# Patient Record
Sex: FEMALE | Race: BLACK OR AFRICAN AMERICAN | URBAN - METROPOLITAN AREA
[De-identification: names, ages, dates, MRNs, and addresses within clinical notes are randomized per-mention and may not be internally consistent; named-entity substitution may affect disease eponyms.]

---

## 2018-03-21 ENCOUNTER — OFFICE VISIT (OUTPATIENT)
Dept: FAMILY MEDICINE CLINIC | Facility: CLINIC | Age: 43
End: 2018-03-21

## 2018-03-21 VITALS
TEMPERATURE: 98 F | WEIGHT: 140 LBS | HEIGHT: 61 IN | BODY MASS INDEX: 26.43 KG/M2 | DIASTOLIC BLOOD PRESSURE: 81 MMHG | RESPIRATION RATE: 16 BRPM | SYSTOLIC BLOOD PRESSURE: 127 MMHG | HEART RATE: 88 BPM

## 2018-03-21 DIAGNOSIS — J01.10 ACUTE NON-RECURRENT FRONTAL SINUSITIS: Primary | ICD-10-CM

## 2018-03-21 DIAGNOSIS — J45.21 MILD INTERMITTENT ASTHMA WITH ACUTE EXACERBATION: ICD-10-CM

## 2018-03-21 PROCEDURE — 99212 OFFICE O/P EST SF 10 MIN: CPT | Performed by: FAMILY MEDICINE

## 2018-03-21 PROCEDURE — 99214 OFFICE O/P EST MOD 30 MIN: CPT | Performed by: FAMILY MEDICINE

## 2018-03-21 RX ORDER — AMOXICILLIN AND CLAVULANATE POTASSIUM 875; 125 MG/1; MG/1
1 TABLET, FILM COATED ORAL 2 TIMES DAILY
Qty: 20 TABLET | Refills: 0 | Status: SHIPPED | OUTPATIENT
Start: 2018-03-21 | End: 2018-03-31

## 2018-03-21 RX ORDER — ALBUTEROL SULFATE 90 UG/1
2 AEROSOL, METERED RESPIRATORY (INHALATION) EVERY 4 HOURS PRN
Qty: 1 INHALER | Refills: 6 | Status: SHIPPED | OUTPATIENT
Start: 2018-03-21 | End: 2020-10-28

## 2018-03-21 NOTE — PROGRESS NOTES
HPI:  Makenna Nunez is a 41year old female who presents for sinus concerns. Having severe congestion, headache, sinus pressure, and thick green nasal drainage. No fever. Using Allegra-D with some relief.   States she has a cough and asthma has been uncontrol Jeane Luna

## 2018-03-26 ENCOUNTER — APPOINTMENT (OUTPATIENT)
Dept: LAB | Age: 43
End: 2018-03-26
Attending: FAMILY MEDICINE
Payer: COMMERCIAL

## 2018-03-26 ENCOUNTER — OFFICE VISIT (OUTPATIENT)
Dept: FAMILY MEDICINE CLINIC | Facility: CLINIC | Age: 43
End: 2018-03-26

## 2018-03-26 VITALS
BODY MASS INDEX: 26.55 KG/M2 | WEIGHT: 140.63 LBS | DIASTOLIC BLOOD PRESSURE: 82 MMHG | SYSTOLIC BLOOD PRESSURE: 121 MMHG | TEMPERATURE: 98 F | RESPIRATION RATE: 16 BRPM | HEIGHT: 61 IN | HEART RATE: 77 BPM

## 2018-03-26 DIAGNOSIS — Z12.39 SCREENING FOR BREAST CANCER: ICD-10-CM

## 2018-03-26 DIAGNOSIS — Z00.00 ROUTINE PHYSICAL EXAMINATION: ICD-10-CM

## 2018-03-26 DIAGNOSIS — Z00.00 ROUTINE PHYSICAL EXAMINATION: Primary | ICD-10-CM

## 2018-03-26 DIAGNOSIS — Z01.419 ENCOUNTER FOR ANNUAL ROUTINE GYNECOLOGICAL EXAMINATION: ICD-10-CM

## 2018-03-26 DIAGNOSIS — J45.21 MILD INTERMITTENT ASTHMA WITH ACUTE EXACERBATION: ICD-10-CM

## 2018-03-26 LAB
ALBUMIN SERPL BCP-MCNC: 4.3 G/DL (ref 3.5–4.8)
ALBUMIN/GLOB SERPL: 1.4 {RATIO} (ref 1–2)
ALP SERPL-CCNC: 41 U/L (ref 32–100)
ALT SERPL-CCNC: 11 U/L (ref 14–54)
ANION GAP SERPL CALC-SCNC: 10 MMOL/L (ref 0–18)
AST SERPL-CCNC: 19 U/L (ref 15–41)
BILIRUB SERPL-MCNC: 0.3 MG/DL (ref 0.3–1.2)
BUN SERPL-MCNC: 8 MG/DL (ref 8–20)
BUN/CREAT SERPL: 15.7 (ref 10–20)
CALCIUM SERPL-MCNC: 9 MG/DL (ref 8.5–10.5)
CHLORIDE SERPL-SCNC: 102 MMOL/L (ref 95–110)
CHOLEST SERPL-MCNC: 190 MG/DL (ref 110–200)
CO2 SERPL-SCNC: 25 MMOL/L (ref 22–32)
CREAT SERPL-MCNC: 0.51 MG/DL (ref 0.5–1.5)
ERYTHROCYTE [DISTWIDTH] IN BLOOD BY AUTOMATED COUNT: 14 % (ref 11–15)
GLOBULIN PLAS-MCNC: 3.1 G/DL (ref 2.5–3.7)
GLUCOSE SERPL-MCNC: 74 MG/DL (ref 70–99)
HCT VFR BLD AUTO: 36.5 % (ref 35–48)
HDLC SERPL-MCNC: 70 MG/DL
HGB BLD-MCNC: 11.8 G/DL (ref 12–16)
LDLC SERPL CALC-MCNC: 102 MG/DL (ref 0–99)
MCH RBC QN AUTO: 26.9 PG (ref 27–32)
MCHC RBC AUTO-ENTMCNC: 32.5 G/DL (ref 32–37)
MCV RBC AUTO: 82.7 FL (ref 80–100)
NONHDLC SERPL-MCNC: 120 MG/DL
OSMOLALITY UR CALC.SUM OF ELEC: 281 MOSM/KG (ref 275–295)
PATIENT FASTING: NO
PLATELET # BLD AUTO: 223 K/UL (ref 140–400)
PMV BLD AUTO: 10.8 FL (ref 7.4–10.3)
POTASSIUM SERPL-SCNC: 3.4 MMOL/L (ref 3.3–5.1)
PROT SERPL-MCNC: 7.4 G/DL (ref 5.9–8.4)
RBC # BLD AUTO: 4.41 M/UL (ref 3.7–5.4)
SODIUM SERPL-SCNC: 137 MMOL/L (ref 136–144)
TRIGL SERPL-MCNC: 92 MG/DL (ref 1–149)
TSH SERPL-ACNC: 0.63 UIU/ML (ref 0.45–5.33)
WBC # BLD AUTO: 4.9 K/UL (ref 4–11)

## 2018-03-26 PROCEDURE — 87340 HEPATITIS B SURFACE AG IA: CPT

## 2018-03-26 PROCEDURE — 86706 HEP B SURFACE ANTIBODY: CPT

## 2018-03-26 PROCEDURE — 87389 HIV-1 AG W/HIV-1&-2 AB AG IA: CPT

## 2018-03-26 PROCEDURE — 86708 HEPATITIS A ANTIBODY: CPT

## 2018-03-26 PROCEDURE — 36415 COLL VENOUS BLD VENIPUNCTURE: CPT

## 2018-03-26 PROCEDURE — 84443 ASSAY THYROID STIM HORMONE: CPT

## 2018-03-26 PROCEDURE — 99396 PREV VISIT EST AGE 40-64: CPT | Performed by: FAMILY MEDICINE

## 2018-03-26 PROCEDURE — 85027 COMPLETE CBC AUTOMATED: CPT

## 2018-03-26 PROCEDURE — 80053 COMPREHEN METABOLIC PANEL: CPT

## 2018-03-26 PROCEDURE — 82306 VITAMIN D 25 HYDROXY: CPT

## 2018-03-26 PROCEDURE — 80061 LIPID PANEL: CPT

## 2018-03-26 PROCEDURE — 90715 TDAP VACCINE 7 YRS/> IM: CPT | Performed by: FAMILY MEDICINE

## 2018-03-26 PROCEDURE — 86803 HEPATITIS C AB TEST: CPT

## 2018-03-26 PROCEDURE — 86780 TREPONEMA PALLIDUM: CPT

## 2018-03-26 PROCEDURE — 90471 IMMUNIZATION ADMIN: CPT | Performed by: FAMILY MEDICINE

## 2018-03-26 PROCEDURE — 80500 HEPATITIS A B + C PROFILE: CPT

## 2018-03-26 PROCEDURE — 86704 HEP B CORE ANTIBODY TOTAL: CPT

## 2018-03-26 NOTE — PROGRESS NOTES
HPI: 41 yr old female who presents for physical. Seen recently for sinusitis and asthma exacerbation. States asthma is improved with Flovent daily. Not using Albuterol any longer. Congestion improving. Last pap was 2 years ago.   Never had abnormal. St supple. Good ROM. No LAD.   Thyroid normal.  CV:  Regular rate and rhythm; no murmurs  Lungs:  Clear to ausculation; good aeration               No wheezes, rales or rhonchi  Abd: soft, non-tender, non-distended          Normal bowel sounds; no masses

## 2018-03-27 LAB
C TRACH DNA SPEC QL NAA+PROBE: NEGATIVE
HAV AB SER QL IA: NONREACTIVE
HBV CORE AB SERPL QL IA: NONREACTIVE
HBV SURFACE AB SER-ACNC: <3.1 MIU/ML (ref ?–10)
HBV SURFACE AG SERPL QL IA: NONREACTIVE
HBV SURFACE AG SERPL QL IA: NONREACTIVE
HCV AB SERPL QL IA: NONREACTIVE
HIV1+2 AB SERPL QL IA: NONREACTIVE
N GONORRHOEA DNA SPEC QL NAA+PROBE: NEGATIVE

## 2018-03-28 LAB
25(OH)D3 SERPL-MCNC: 28 NG/ML
HPV I/H RISK 1 DNA SPEC QL NAA+PROBE: NEGATIVE
T PALLIDUM AB SER QL: NEGATIVE

## 2018-04-05 ENCOUNTER — HOSPITAL ENCOUNTER (OUTPATIENT)
Dept: MAMMOGRAPHY | Facility: HOSPITAL | Age: 43
Discharge: HOME | End: 2018-04-05
Attending: FAMILY MEDICINE
Payer: COMMERCIAL

## 2018-04-05 DIAGNOSIS — Z12.39 SCREENING FOR BREAST CANCER: ICD-10-CM

## 2018-04-05 PROCEDURE — 77067 SCR MAMMO BI INCL CAD: CPT | Performed by: FAMILY MEDICINE

## 2019-04-01 ENCOUNTER — OFFICE VISIT (OUTPATIENT)
Dept: FAMILY MEDICINE CLINIC | Facility: CLINIC | Age: 44
End: 2019-04-01
Payer: COMMERCIAL

## 2019-04-01 VITALS
RESPIRATION RATE: 16 BRPM | HEART RATE: 73 BPM | TEMPERATURE: 98 F | BODY MASS INDEX: 24 KG/M2 | SYSTOLIC BLOOD PRESSURE: 124 MMHG | WEIGHT: 128.63 LBS | DIASTOLIC BLOOD PRESSURE: 77 MMHG

## 2019-04-01 DIAGNOSIS — J01.00 ACUTE NON-RECURRENT MAXILLARY SINUSITIS: Primary | ICD-10-CM

## 2019-04-01 PROCEDURE — 99213 OFFICE O/P EST LOW 20 MIN: CPT | Performed by: FAMILY MEDICINE

## 2019-04-01 PROCEDURE — 99212 OFFICE O/P EST SF 10 MIN: CPT | Performed by: FAMILY MEDICINE

## 2019-04-01 RX ORDER — AMOXICILLIN AND CLAVULANATE POTASSIUM 875; 125 MG/1; MG/1
1 TABLET, FILM COATED ORAL 2 TIMES DAILY
Qty: 20 TABLET | Refills: 0 | OUTPATIENT
Start: 2019-04-01 | End: 2019-04-11

## 2019-04-01 NOTE — PROGRESS NOTES
HPI: Mylene Gray is a 45year old female who presents for sinus complaints. Has sinus drainage. Started on Saturday. Has headache, body aches, chills, cough, and dizziness. No v/d. Decreased appetite. No fever. Denies sore throat.   Son was recently diagnos

## 2019-04-03 ENCOUNTER — TELEPHONE (OUTPATIENT)
Dept: FAMILY MEDICINE CLINIC | Facility: CLINIC | Age: 44
End: 2019-04-03

## 2019-04-03 NOTE — TELEPHONE ENCOUNTER
Dr Madi Garcia, please see pt's message. She was seen on Monday, and you gave her a note for 2 days, returning to work today. Ok to extend?

## 2019-04-03 NOTE — TELEPHONE ENCOUNTER
Pt called in for note for her job    Pt said the note is for extended time off work    Pt said still not feeling well does not know if Monday will be a sufficient    Pt asked if note can be emailed.   Pt also has [employer]

## 2019-04-03 NOTE — TELEPHONE ENCOUNTER
Pt stts she would like the note faxed over to her job at [phone] attention Koko Luis.  Please call pt when faxed over at [phone]

## 2019-10-16 ENCOUNTER — APPOINTMENT (OUTPATIENT)
Dept: LAB | Age: 44
End: 2019-10-16
Attending: FAMILY MEDICINE
Payer: COMMERCIAL

## 2019-10-16 ENCOUNTER — OFFICE VISIT (OUTPATIENT)
Dept: FAMILY MEDICINE CLINIC | Facility: CLINIC | Age: 44
End: 2019-10-16
Payer: COMMERCIAL

## 2019-10-16 VITALS
WEIGHT: 132 LBS | SYSTOLIC BLOOD PRESSURE: 129 MMHG | DIASTOLIC BLOOD PRESSURE: 72 MMHG | BODY MASS INDEX: 25.24 KG/M2 | HEIGHT: 60.47 IN | HEART RATE: 61 BPM | TEMPERATURE: 98 F | RESPIRATION RATE: 16 BRPM

## 2019-10-16 DIAGNOSIS — Z00.00 ROUTINE PHYSICAL EXAMINATION: Primary | ICD-10-CM

## 2019-10-16 DIAGNOSIS — Z12.39 SCREENING FOR BREAST CANCER: ICD-10-CM

## 2019-10-16 DIAGNOSIS — Z00.00 ROUTINE PHYSICAL EXAMINATION: ICD-10-CM

## 2019-10-16 DIAGNOSIS — N92.0 MENORRHAGIA WITH REGULAR CYCLE: ICD-10-CM

## 2019-10-16 DIAGNOSIS — N89.8 VAGINAL DISCHARGE: ICD-10-CM

## 2019-10-16 PROCEDURE — 36415 COLL VENOUS BLD VENIPUNCTURE: CPT

## 2019-10-16 PROCEDURE — 85027 COMPLETE CBC AUTOMATED: CPT

## 2019-10-16 PROCEDURE — 80053 COMPREHEN METABOLIC PANEL: CPT

## 2019-10-16 PROCEDURE — 84443 ASSAY THYROID STIM HORMONE: CPT

## 2019-10-16 PROCEDURE — 99396 PREV VISIT EST AGE 40-64: CPT | Performed by: FAMILY MEDICINE

## 2019-10-16 PROCEDURE — 80061 LIPID PANEL: CPT

## 2019-10-16 PROCEDURE — 82306 VITAMIN D 25 HYDROXY: CPT

## 2019-10-16 NOTE — PROGRESS NOTES
HPI: Zenaida Woo is a 45year old female who presents for physical. Exercises twice a week. Eats healthy. Regular cycles but states her periods have been getting longer. Starts light but then gets heavier.   Can go through multiple pads in a short period symptoms  Neurological:  Negative for gait disturbance  Psychiatric:  Negative for inappropriate interaction and psychiatric symptoms      Objective:   Gen: AOx3. NAD.    /72   Pulse 61   Temp 97.9 °F (36.6 °C) (Oral)   Resp 16   Ht 5' 0.47\" (1.536 m

## 2019-10-18 RX ORDER — METRONIDAZOLE 500 MG/1
500 TABLET ORAL 2 TIMES DAILY
Qty: 14 TABLET | Refills: 0 | Status: SHIPPED | OUTPATIENT
Start: 2019-10-18 | End: 2019-10-25

## 2019-10-23 DIAGNOSIS — D64.9 ANEMIA, UNSPECIFIED TYPE: Primary | ICD-10-CM

## 2020-10-16 ENCOUNTER — TELEPHONE (OUTPATIENT)
Dept: FAMILY MEDICINE CLINIC | Facility: CLINIC | Age: 45
End: 2020-10-16

## 2020-10-16 NOTE — TELEPHONE ENCOUNTER
Pt called in to check her time for a px for 10/19. I did not see that she made one. Pt requests if dr can see her on 10/19, if not sooner then 12/7?  Please advise

## 2020-10-17 NOTE — TELEPHONE ENCOUNTER
MICAELA, if pt needs her px sooner than 12/7, please schedule as requested. May use res 24 if needed, to make 30 min appt.

## 2020-10-28 ENCOUNTER — OFFICE VISIT (OUTPATIENT)
Dept: FAMILY MEDICINE CLINIC | Facility: CLINIC | Age: 45
End: 2020-10-28
Payer: COMMERCIAL

## 2020-10-28 ENCOUNTER — LAB ENCOUNTER (OUTPATIENT)
Dept: LAB | Age: 45
End: 2020-10-28
Attending: FAMILY MEDICINE
Payer: COMMERCIAL

## 2020-10-28 VITALS
BODY MASS INDEX: 26.43 KG/M2 | HEIGHT: 61 IN | SYSTOLIC BLOOD PRESSURE: 125 MMHG | TEMPERATURE: 97 F | WEIGHT: 140 LBS | RESPIRATION RATE: 16 BRPM | DIASTOLIC BLOOD PRESSURE: 78 MMHG | HEART RATE: 66 BPM

## 2020-10-28 DIAGNOSIS — Z00.00 ROUTINE PHYSICAL EXAMINATION: ICD-10-CM

## 2020-10-28 DIAGNOSIS — J45.20 MILD INTERMITTENT ASTHMA WITHOUT COMPLICATION: ICD-10-CM

## 2020-10-28 DIAGNOSIS — D64.9 ANEMIA, UNSPECIFIED TYPE: ICD-10-CM

## 2020-10-28 DIAGNOSIS — Z12.31 ENCOUNTER FOR SCREENING MAMMOGRAM FOR BREAST CANCER: ICD-10-CM

## 2020-10-28 DIAGNOSIS — Z00.00 ROUTINE PHYSICAL EXAMINATION: Primary | ICD-10-CM

## 2020-10-28 PROCEDURE — 87340 HEPATITIS B SURFACE AG IA: CPT

## 2020-10-28 PROCEDURE — 90651 9VHPV VACCINE 2/3 DOSE IM: CPT | Performed by: FAMILY MEDICINE

## 2020-10-28 PROCEDURE — 84466 ASSAY OF TRANSFERRIN: CPT

## 2020-10-28 PROCEDURE — 83540 ASSAY OF IRON: CPT

## 2020-10-28 PROCEDURE — 82728 ASSAY OF FERRITIN: CPT

## 2020-10-28 PROCEDURE — 80053 COMPREHEN METABOLIC PANEL: CPT

## 2020-10-28 PROCEDURE — 86704 HEP B CORE ANTIBODY TOTAL: CPT

## 2020-10-28 PROCEDURE — 90471 IMMUNIZATION ADMIN: CPT | Performed by: FAMILY MEDICINE

## 2020-10-28 PROCEDURE — 3074F SYST BP LT 130 MM HG: CPT | Performed by: FAMILY MEDICINE

## 2020-10-28 PROCEDURE — 3078F DIAST BP <80 MM HG: CPT | Performed by: FAMILY MEDICINE

## 2020-10-28 PROCEDURE — 80500 HEPATITIS A B + C PROFILE: CPT

## 2020-10-28 PROCEDURE — 3008F BODY MASS INDEX DOCD: CPT | Performed by: FAMILY MEDICINE

## 2020-10-28 PROCEDURE — 80061 LIPID PANEL: CPT

## 2020-10-28 PROCEDURE — 87389 HIV-1 AG W/HIV-1&-2 AB AG IA: CPT

## 2020-10-28 PROCEDURE — 86803 HEPATITIS C AB TEST: CPT

## 2020-10-28 PROCEDURE — 36415 COLL VENOUS BLD VENIPUNCTURE: CPT

## 2020-10-28 PROCEDURE — 86708 HEPATITIS A ANTIBODY: CPT

## 2020-10-28 PROCEDURE — 99396 PREV VISIT EST AGE 40-64: CPT | Performed by: FAMILY MEDICINE

## 2020-10-28 PROCEDURE — 87591 N.GONORRHOEAE DNA AMP PROB: CPT

## 2020-10-28 PROCEDURE — 84443 ASSAY THYROID STIM HORMONE: CPT

## 2020-10-28 PROCEDURE — 85027 COMPLETE CBC AUTOMATED: CPT

## 2020-10-28 PROCEDURE — 86780 TREPONEMA PALLIDUM: CPT

## 2020-10-28 PROCEDURE — 87491 CHLMYD TRACH DNA AMP PROBE: CPT

## 2020-10-28 PROCEDURE — 86706 HEP B SURFACE ANTIBODY: CPT

## 2020-10-28 RX ORDER — ALBUTEROL SULFATE 90 UG/1
2 AEROSOL, METERED RESPIRATORY (INHALATION) EVERY 4 HOURS PRN
Qty: 1 INHALER | Refills: 6 | Status: SHIPPED | OUTPATIENT
Start: 2020-10-28

## 2020-10-28 NOTE — PROGRESS NOTES
HPI: 39 yr old female who presents for physical. Works as insurance rep. Works in office. Stopped exercising due to pandemic. Gained some weight. Walks now. Eats healthy. Has 3 children. Ages 23,16 and 5. Asthma is controlled.   Only uses Albuterol w aeration               No wheezes, rales or rhonchi  Abd: soft, non-tender, non-distended          Normal bowel sounds; no masses          No hepatosplenomegaly  Breasts:  Normal appearance bilateral.  No masses or lesions noted.   Normal nipples bilateral.

## 2020-11-04 ENCOUNTER — PATIENT MESSAGE (OUTPATIENT)
Dept: FAMILY MEDICINE CLINIC | Facility: CLINIC | Age: 45
End: 2020-11-04

## 2020-11-04 ENCOUNTER — HOSPITAL ENCOUNTER (OUTPATIENT)
Dept: MAMMOGRAPHY | Facility: HOSPITAL | Age: 45
Discharge: HOME | End: 2020-11-04
Attending: FAMILY MEDICINE
Payer: COMMERCIAL

## 2020-11-04 DIAGNOSIS — Z12.31 ENCOUNTER FOR SCREENING MAMMOGRAM FOR BREAST CANCER: ICD-10-CM

## 2020-11-04 DIAGNOSIS — R92.8 ABNORMAL MAMMOGRAM OF RIGHT BREAST: Primary | ICD-10-CM

## 2020-11-04 PROCEDURE — 77063 BREAST TOMOSYNTHESIS BI: CPT | Performed by: FAMILY MEDICINE

## 2020-11-04 PROCEDURE — 77067 SCR MAMMO BI INCL CAD: CPT | Performed by: FAMILY MEDICINE

## 2020-11-04 NOTE — TELEPHONE ENCOUNTER
From: Devi Wade  To: Vinny Varghese DO  Sent: 11/4/2020 11:22 AM CST  Subject: Test Results Question    I have a question about [facility] SANDY 2D+3D SCREENING BILAT (CPT=77067/60207) resulted on 11/4/20, 7:48 AM.    Morning does this mean I have to have

## 2020-11-12 ENCOUNTER — HOSPITAL ENCOUNTER (OUTPATIENT)
Dept: MAMMOGRAPHY | Facility: HOSPITAL | Age: 45
Discharge: HOME | End: 2020-11-12
Attending: FAMILY MEDICINE
Payer: COMMERCIAL

## 2020-11-12 DIAGNOSIS — R92.8 ABNORMAL MAMMOGRAM: ICD-10-CM

## 2020-11-12 PROCEDURE — 77061 BREAST TOMOSYNTHESIS UNI: CPT | Performed by: FAMILY MEDICINE

## 2020-11-12 PROCEDURE — 77065 DX MAMMO INCL CAD UNI: CPT | Performed by: FAMILY MEDICINE

## 2020-11-30 ENCOUNTER — NURSE ONLY (OUTPATIENT)
Dept: FAMILY MEDICINE CLINIC | Facility: CLINIC | Age: 45
End: 2020-11-30
Payer: COMMERCIAL

## 2020-11-30 PROCEDURE — 90651 9VHPV VACCINE 2/3 DOSE IM: CPT | Performed by: FAMILY MEDICINE

## 2020-11-30 PROCEDURE — 90471 IMMUNIZATION ADMIN: CPT | Performed by: FAMILY MEDICINE

## 2021-02-08 ENCOUNTER — TELEMEDICINE (OUTPATIENT)
Dept: FAMILY MEDICINE CLINIC | Facility: CLINIC | Age: 46
End: 2021-02-08

## 2021-02-08 ENCOUNTER — NURSE TRIAGE (OUTPATIENT)
Dept: FAMILY MEDICINE CLINIC | Facility: CLINIC | Age: 46
End: 2021-02-08

## 2021-02-08 DIAGNOSIS — R68.89 FLU-LIKE SYMPTOMS: Primary | ICD-10-CM

## 2021-02-08 PROCEDURE — 99213 OFFICE O/P EST LOW 20 MIN: CPT | Performed by: PHYSICIAN ASSISTANT

## 2021-02-08 NOTE — PROGRESS NOTES
Please note that the following visit was completed using two-way, real-time interactive audio and/or video communication.   This has been done in good cynthia to provide continuity of care in the best interest of the provider-patient relationship, due to the Aero Soln Inhale 2 puffs into the lungs every 4 (four) hours as needed for Wheezing. 1 Inhaler 6   • Multiple Vitamins-Minerals (MULTIVITAL) Oral Tab Take 1 tablet by mouth daily. • ALBUTEROL SULFATE HFA IN Inhale  into the lungs.          Allergies:  S discussion above. Patient reminded to practice good health and safety measures including washing hands, social distancing, covering mouth when coughing/ sneezing, avoid social meetings/ gatherings in face of this Covid 19 pandemic.     Patient verbalize

## 2021-05-10 ENCOUNTER — TELEPHONE (OUTPATIENT)
Dept: FAMILY MEDICINE CLINIC | Facility: CLINIC | Age: 46
End: 2021-05-10

## 2021-05-10 NOTE — TELEPHONE ENCOUNTER
Please call patient to reschedule a nurse visit for her HPV vaccine. Patient could not receive it when she came on 4/28 because it was less than 2 weeks after her COVID-19 vaccine.

## 2022-05-12 ENCOUNTER — PATIENT MESSAGE (OUTPATIENT)
Dept: FAMILY MEDICINE CLINIC | Facility: CLINIC | Age: 47
End: 2022-05-12

## 2022-05-13 ENCOUNTER — NURSE TRIAGE (OUTPATIENT)
Dept: FAMILY MEDICINE CLINIC | Facility: CLINIC | Age: 47
End: 2022-05-13

## 2022-05-13 NOTE — TELEPHONE ENCOUNTER
Called patient, confirmed name and . Informed of advise below. Patient verbalized understanding and agrees.       Future Appointments   Date Time Provider aCrlos Emery   2022 10:15 AM DO GEORGE Del Angel

## 2022-05-18 ENCOUNTER — OFFICE VISIT (OUTPATIENT)
Dept: FAMILY MEDICINE CLINIC | Facility: CLINIC | Age: 47
End: 2022-05-18
Payer: COMMERCIAL

## 2022-05-18 ENCOUNTER — LAB ENCOUNTER (OUTPATIENT)
Dept: LAB | Age: 47
End: 2022-05-18
Attending: FAMILY MEDICINE
Payer: COMMERCIAL

## 2022-05-18 VITALS
HEIGHT: 61 IN | OXYGEN SATURATION: 100 % | BODY MASS INDEX: 26.62 KG/M2 | RESPIRATION RATE: 16 BRPM | TEMPERATURE: 98 F | SYSTOLIC BLOOD PRESSURE: 110 MMHG | DIASTOLIC BLOOD PRESSURE: 72 MMHG | HEART RATE: 74 BPM | WEIGHT: 141 LBS

## 2022-05-18 DIAGNOSIS — Z12.11 ENCOUNTER FOR SCREENING COLONOSCOPY: Primary | ICD-10-CM

## 2022-05-18 DIAGNOSIS — J45.31 MILD PERSISTENT ASTHMA WITH ACUTE EXACERBATION: ICD-10-CM

## 2022-05-18 DIAGNOSIS — Z00.00 ROUTINE PHYSICAL EXAMINATION: ICD-10-CM

## 2022-05-18 DIAGNOSIS — R10.2 PELVIC PAIN: ICD-10-CM

## 2022-05-18 DIAGNOSIS — Z12.31 ENCOUNTER FOR SCREENING MAMMOGRAM FOR MALIGNANT NEOPLASM OF BREAST: ICD-10-CM

## 2022-05-18 LAB
ALBUMIN SERPL-MCNC: 3.6 G/DL (ref 3.4–5)
ALBUMIN/GLOB SERPL: 0.9 {RATIO} (ref 1–2)
ALP LIVER SERPL-CCNC: 54 U/L
ALT SERPL-CCNC: 11 U/L
ANION GAP SERPL CALC-SCNC: 7 MMOL/L (ref 0–18)
AST SERPL-CCNC: 13 U/L (ref 15–37)
B-HCG SERPL-ACNC: <1 MIU/ML
BILIRUB SERPL-MCNC: 0.3 MG/DL (ref 0.1–2)
BUN BLD-MCNC: 7 MG/DL (ref 7–18)
BUN/CREAT SERPL: 10.8 (ref 10–20)
CALCIUM BLD-MCNC: 8.7 MG/DL (ref 8.5–10.1)
CHLORIDE SERPL-SCNC: 107 MMOL/L (ref 98–112)
CHOLEST SERPL-MCNC: 166 MG/DL (ref ?–200)
CO2 SERPL-SCNC: 26 MMOL/L (ref 21–32)
CREAT BLD-MCNC: 0.65 MG/DL
DEPRECATED RDW RBC AUTO: 48.6 FL (ref 35.1–46.3)
ERYTHROCYTE [DISTWIDTH] IN BLOOD BY AUTOMATED COUNT: 15.5 % (ref 11–15)
EST. AVERAGE GLUCOSE BLD GHB EST-MCNC: 94 MG/DL (ref 68–126)
FASTING PATIENT LIPID ANSWER: YES
FASTING STATUS PATIENT QL REPORTED: YES
GLOBULIN PLAS-MCNC: 3.9 G/DL (ref 2.8–4.4)
GLUCOSE BLD-MCNC: 86 MG/DL (ref 70–99)
HAV AB SER QL IA: NONREACTIVE
HBA1C MFR BLD: 4.9 % (ref ?–5.7)
HBV CORE AB SERPL QL IA: NONREACTIVE
HBV SURFACE AB SER QL: NONREACTIVE
HBV SURFACE AB SERPL IA-ACNC: <3.1 MIU/ML
HBV SURFACE AG SERPL QL IA: NONREACTIVE
HCT VFR BLD AUTO: 37.1 %
HCV AB SERPL QL IA: NONREACTIVE
HDLC SERPL-MCNC: 66 MG/DL (ref 40–59)
HGB BLD-MCNC: 11.2 G/DL
LDLC SERPL CALC-MCNC: 88 MG/DL (ref ?–100)
MCH RBC QN AUTO: 26 PG (ref 26–34)
MCHC RBC AUTO-ENTMCNC: 30.2 G/DL (ref 31–37)
MCV RBC AUTO: 86.3 FL
NONHDLC SERPL-MCNC: 100 MG/DL (ref ?–130)
OSMOLALITY SERPL CALC.SUM OF ELEC: 287 MOSM/KG (ref 275–295)
PLATELET # BLD AUTO: 234 10(3)UL (ref 150–450)
POTASSIUM SERPL-SCNC: 3.6 MMOL/L (ref 3.5–5.1)
PROT SERPL-MCNC: 7.5 G/DL (ref 6.4–8.2)
RBC # BLD AUTO: 4.3 X10(6)UL
SODIUM SERPL-SCNC: 140 MMOL/L (ref 136–145)
TRIGL SERPL-MCNC: 59 MG/DL (ref 30–149)
TSI SER-ACNC: 1.1 MIU/ML (ref 0.36–3.74)
VLDLC SERPL CALC-MCNC: 9 MG/DL (ref 0–30)
WBC # BLD AUTO: 10.1 X10(3) UL (ref 4–11)

## 2022-05-18 PROCEDURE — 80061 LIPID PANEL: CPT

## 2022-05-18 PROCEDURE — 36415 COLL VENOUS BLD VENIPUNCTURE: CPT

## 2022-05-18 PROCEDURE — 87389 HIV-1 AG W/HIV-1&-2 AB AG IA: CPT

## 2022-05-18 PROCEDURE — 3008F BODY MASS INDEX DOCD: CPT | Performed by: FAMILY MEDICINE

## 2022-05-18 PROCEDURE — 85027 COMPLETE CBC AUTOMATED: CPT

## 2022-05-18 PROCEDURE — 86803 HEPATITIS C AB TEST: CPT

## 2022-05-18 PROCEDURE — 99396 PREV VISIT EST AGE 40-64: CPT | Performed by: FAMILY MEDICINE

## 2022-05-18 PROCEDURE — 86780 TREPONEMA PALLIDUM: CPT

## 2022-05-18 PROCEDURE — 86708 HEPATITIS A ANTIBODY: CPT

## 2022-05-18 PROCEDURE — 3074F SYST BP LT 130 MM HG: CPT | Performed by: FAMILY MEDICINE

## 2022-05-18 PROCEDURE — 3078F DIAST BP <80 MM HG: CPT | Performed by: FAMILY MEDICINE

## 2022-05-18 PROCEDURE — 80053 COMPREHEN METABOLIC PANEL: CPT

## 2022-05-18 PROCEDURE — 83036 HEMOGLOBIN GLYCOSYLATED A1C: CPT

## 2022-05-18 PROCEDURE — 84443 ASSAY THYROID STIM HORMONE: CPT

## 2022-05-18 PROCEDURE — 80503 PATH CLIN CONSLTJ SF 5-20: CPT

## 2022-05-18 PROCEDURE — 86704 HEP B CORE ANTIBODY TOTAL: CPT

## 2022-05-18 PROCEDURE — 86706 HEP B SURFACE ANTIBODY: CPT

## 2022-05-18 PROCEDURE — 84702 CHORIONIC GONADOTROPIN TEST: CPT

## 2022-05-18 PROCEDURE — 87340 HEPATITIS B SURFACE AG IA: CPT

## 2022-05-20 LAB — T PALLIDUM AB SER QL: NEGATIVE

## 2022-05-21 ENCOUNTER — HOSPITAL ENCOUNTER (OUTPATIENT)
Dept: MAMMOGRAPHY | Facility: HOSPITAL | Age: 47
Discharge: HOME | End: 2022-05-21
Attending: FAMILY MEDICINE
Payer: COMMERCIAL

## 2022-05-21 DIAGNOSIS — Z12.31 ENCOUNTER FOR SCREENING MAMMOGRAM FOR MALIGNANT NEOPLASM OF BREAST: ICD-10-CM

## 2022-05-21 PROCEDURE — 77063 BREAST TOMOSYNTHESIS BI: CPT | Performed by: FAMILY MEDICINE

## 2022-05-21 PROCEDURE — 77067 SCR MAMMO BI INCL CAD: CPT | Performed by: FAMILY MEDICINE

## 2022-05-25 ENCOUNTER — OFFICE VISIT (OUTPATIENT)
Dept: FAMILY MEDICINE CLINIC | Facility: CLINIC | Age: 47
End: 2022-05-25
Payer: COMMERCIAL

## 2022-05-25 VITALS
RESPIRATION RATE: 16 BRPM | SYSTOLIC BLOOD PRESSURE: 117 MMHG | DIASTOLIC BLOOD PRESSURE: 65 MMHG | HEART RATE: 83 BPM | TEMPERATURE: 98 F

## 2022-05-25 DIAGNOSIS — N89.8 VAGINAL DISCHARGE: Primary | ICD-10-CM

## 2022-05-25 PROCEDURE — 3078F DIAST BP <80 MM HG: CPT | Performed by: FAMILY MEDICINE

## 2022-05-25 PROCEDURE — 99213 OFFICE O/P EST LOW 20 MIN: CPT | Performed by: FAMILY MEDICINE

## 2022-05-25 PROCEDURE — 3074F SYST BP LT 130 MM HG: CPT | Performed by: FAMILY MEDICINE

## 2022-05-26 LAB
C TRACH DNA SPEC QL NAA+PROBE: NEGATIVE
N GONORRHOEA DNA SPEC QL NAA+PROBE: NEGATIVE

## 2022-05-27 ENCOUNTER — TELEPHONE (OUTPATIENT)
Dept: FAMILY MEDICINE CLINIC | Facility: CLINIC | Age: 47
End: 2022-05-27

## 2022-05-27 ENCOUNTER — PATIENT MESSAGE (OUTPATIENT)
Dept: FAMILY MEDICINE CLINIC | Facility: CLINIC | Age: 47
End: 2022-05-27

## 2022-05-27 LAB — TRICHOMONAS SCREEN: POSITIVE

## 2022-05-27 RX ORDER — METRONIDAZOLE 500 MG/1
500 TABLET ORAL 2 TIMES DAILY
Qty: 14 TABLET | Refills: 0 | Status: SHIPPED | OUTPATIENT
Start: 2022-05-27 | End: 2022-06-03

## 2022-05-27 NOTE — TELEPHONE ENCOUNTER
Per patient called in (verified name and =see also MyChart encounter 22),states that she viewed her test results through [employer] and she is positive trichomonas. Patient   asked what is the difference between trichomonas and bacterial vaginosis, informed that both are STD and treated with antibiotic, the only difference is the bacteria in it  And you can also get BV due to increase normal johanna inside the genital area without sex. Stated understanding.         Please reply to pool: CINTHYA Doyle'

## 2022-05-27 NOTE — TELEPHONE ENCOUNTER
Please let her know she should take metronidazole twice a day for 1 week. This is effective for both infections. Do not drink alcohol while taking this medication. Bacterial vaginosis is not sexually transmitted. Trichomonas usually is. Must make sure partner treated at the same time, otherwise she will quickly get it back.

## 2022-06-14 ENCOUNTER — NURSE ONLY (OUTPATIENT)
Dept: GASTROENTEROLOGY | Facility: CLINIC | Age: 47
End: 2022-06-14

## 2022-06-14 DIAGNOSIS — Z12.11 SCREEN FOR COLON CANCER: Primary | ICD-10-CM

## 2022-06-14 RX ORDER — POLYETHYLENE GLYCOL 3350, SODIUM CHLORIDE, SODIUM BICARBONATE, POTASSIUM CHLORIDE 420; 11.2; 5.72; 1.48 G/4L; G/4L; G/4L; G/4L
POWDER, FOR SOLUTION ORAL
Qty: 4000 ML | Refills: 0 | Status: SHIPPED | OUTPATIENT
Start: 2022-06-14

## 2022-06-14 NOTE — PROGRESS NOTES
Scheduled for:  Colonoscopy-screen 37475    Provider Name:  Dr. Antonette Patel  Date:  6/27/2022  Location:  [facility]  Sedation:  MAC  Time:  1:15 PM Patient made aware [facility] will call the day before with an arrival time. Prep:  Split dose trillyte E-Prescribing Status: Receipt confirmed by pharmacy (6/14/2022 11:40 AM CDT)  Meds/Allergies Reconciled?:  Asher baker aprn- reviewed  Diagnosis with codes:  Colorectal cancer screening Z12.11  Was patient informed to call insurance with codes (Y/N): I confirmed Jamgo with this patient. Referral sent?:  Referral was sent. [facility] or [id number]  notified?:  Electronic case request was sent to [facility] via [employer]. Medication Orders:  DO NOT TAKE: Iron pills, herbal supplements, multi-vitamins, or diet medications (i.e. Phentermine/Vyvanse) for 7 days before exam.  Misc Orders:  Patient was informed that they will need a COVID 19 test prior to their procedure. Patient verbally understood & will await a phone call from [facility] to schedule. Further instructions given by staff: I discussed the prep instructions with the patient which she verbally understood and is aware that I will send the instructions today via [employer].

## 2022-06-14 NOTE — PROGRESS NOTES
Brigette Hint patient for a scheduled telephone colon screening. GI MD preference: none  Insurance:  [employer]- PPO  CBC from: 5/118/2022 resulted in [address]   PCP visit on: 5/25/2022    First colonoscopy: yes     Anticoagulants: no   Diabetic Meds:  No   BP meds(Ace inhibitors/ARB's): no  Weight loss meds (phentermine/vyvanse): no   Iron supplement (RX/OTC): MVI  Height & Weight/BMI: 5'1\"/141lbs/26.66  Hx of Cardiac/CVA issues/(MI/Stroke): no   Devices Pacemaker/Defibrillator/Stents: no   Resp. Issues/Oxygen Use/DAVID/COPD: no   Issues w/Anesthesia: no     Symptoms (Y/N): no     Family history of colon cancer: no     Medications, pharmacy, and allergies verified with patient over the phone. Please advise on orders and prep, thank you.

## 2022-06-14 NOTE — PROGRESS NOTES
Scheduling:  cln w/ iv or mac w/ Dr. Antonieta Candelario or Dr. Jarquin [place]  Dx: crc screening  trilyte split dose sent e-scribe

## 2022-06-20 ENCOUNTER — PATIENT MESSAGE (OUTPATIENT)
Dept: FAMILY MEDICINE CLINIC | Facility: CLINIC | Age: 47
End: 2022-06-20

## 2022-06-20 ENCOUNTER — TELEPHONE (OUTPATIENT)
Dept: FAMILY MEDICINE CLINIC | Facility: CLINIC | Age: 47
End: 2022-06-20

## 2022-06-20 LAB — AMB EXT COVID-19 RESULT: NOT DETECTED

## 2022-06-20 NOTE — TELEPHONE ENCOUNTER
Dr. Fozia Blanco - Please advise on request for oral steroids for persisting cough. Last visit 5/18/22 - Asthma addressed. [employer] Message received. RN called patient. Patient's date of birth and full name both confirmed. Triaged - unclear if this is new symptoms or persisting symptoms. Patient clarifies this is persisting symptoms. Denies worsening or new symptoms. Asking for prednisone treatment, or another steroid treatment. Clear Secretions occasionally. Continues medications discussed at last visit: Robitussin, breathing treatments, flovent. Currently denies shortness of breath, difficulty breathing, chest pain, chest pressure. However, if long conversations, or with exertion, shortness of breath can come back - which resolves with breathing treatments. Denies fever and other URI symptoms. She is agreeable to plan that if symptoms get worse or become severe, she will go to ER or Immediate care. Today: At-home COVID test - Negative. Chart updated.

## 2022-06-20 NOTE — TELEPHONE ENCOUNTER
First Call attempt. Left Voicemail to call back our office. Office phone number provided with office hours. MyChart sent with advice.

## 2022-06-22 ENCOUNTER — TELEPHONE (OUTPATIENT)
Dept: GASTROENTEROLOGY | Facility: CLINIC | Age: 47
End: 2022-06-22

## 2022-06-22 NOTE — TELEPHONE ENCOUNTER
Received e-mail from [address] stating that:    Per pt- pt called insurance to verify coverage - insurance won't pay due to pt didn't meet her deductible. Pt wants to CX her SX    [facility] cancelled in [place] and I removed from appt desk. Please contact patient to see if you would like to reschedule to a later date.      CANCELLED for:  Colonoscopy-screen 45876    Provider Name:  Dr. Nazario Sawyer  Date:  6/27/2022  Location:  [facility]  Sedation:  MAC  Time:  1:15 PM

## 2022-06-28 NOTE — TELEPHONE ENCOUNTER
06/28/2022 Case cx I called patient to possible reschedule patient to call back would like to make sure procedure is going to be covered in full will call to reschedule AT [address] [place] [address]

## 2022-07-18 ENCOUNTER — TELEPHONE (OUTPATIENT)
Dept: INTERNAL MEDICINE CLINIC | Facility: CLINIC | Age: 47
End: 2022-07-18

## 2022-07-18 NOTE — TELEPHONE ENCOUNTER
Dr Rachel Mendoza, please advise, patient seeking prednisone for persisting asthma exacerbation, declines appointment with alternate MD.  No appoinmtent available with Dr Rachel Mendoza today. Seen in office 5/18/2022 where she was noted with asthma exacerbation. Using nebulizer and inhalers, can't get rid of cough or chest tightness. Requested prednisone 6/20/2022 when Dr Rachel Mendoza on vacation. Denies fever,Denies sputm, took Covid test last week negative. No sputum. Tried Robitussin. 5/18/2022  Mild persistent asthma with acute exacerbation    Advised to use daily Flovent. Discussed that this was a controller medication and not as needed.    [address] [facility], DO

## 2022-07-19 RX ORDER — PREDNISONE 20 MG/1
20 TABLET ORAL 2 TIMES DAILY
Qty: 10 TABLET | Refills: 0 | Status: SHIPPED | OUTPATIENT
Start: 2022-07-19 | End: 2022-07-24

## 2022-08-23 ENCOUNTER — OFFICE VISIT (OUTPATIENT)
Dept: FAMILY MEDICINE CLINIC | Facility: CLINIC | Age: 47
End: 2022-08-23
Payer: COMMERCIAL

## 2022-08-23 VITALS
DIASTOLIC BLOOD PRESSURE: 75 MMHG | SYSTOLIC BLOOD PRESSURE: 118 MMHG | TEMPERATURE: 98 F | OXYGEN SATURATION: 99 % | RESPIRATION RATE: 18 BRPM | WEIGHT: 141 LBS | BODY MASS INDEX: 27 KG/M2 | HEART RATE: 69 BPM

## 2022-08-23 DIAGNOSIS — J45.31 MILD PERSISTENT ASTHMA WITH ACUTE EXACERBATION: ICD-10-CM

## 2022-08-23 DIAGNOSIS — R05.3 CHRONIC COUGH: Primary | ICD-10-CM

## 2022-08-23 PROCEDURE — 3078F DIAST BP <80 MM HG: CPT | Performed by: FAMILY MEDICINE

## 2022-08-23 PROCEDURE — 3074F SYST BP LT 130 MM HG: CPT | Performed by: FAMILY MEDICINE

## 2022-08-23 PROCEDURE — 99214 OFFICE O/P EST MOD 30 MIN: CPT | Performed by: FAMILY MEDICINE

## 2022-08-23 RX ORDER — MONTELUKAST SODIUM 10 MG/1
10 TABLET ORAL NIGHTLY
Qty: 90 TABLET | Refills: 1 | Status: SHIPPED | OUTPATIENT
Start: 2022-08-23

## 2022-08-23 RX ORDER — FLUTICASONE PROPIONATE 110 UG/1
2 AEROSOL, METERED RESPIRATORY (INHALATION) 2 TIMES DAILY
Qty: 1 EACH | Refills: 3 | Status: SHIPPED | OUTPATIENT
Start: 2022-08-23

## 2022-08-23 RX ORDER — BENZONATATE 200 MG/1
200 CAPSULE ORAL 3 TIMES DAILY PRN
Qty: 30 CAPSULE | Refills: 1 | Status: SHIPPED | OUTPATIENT
Start: 2022-08-23

## 2022-08-23 RX ORDER — ALBUTEROL SULFATE 90 UG/1
2 AEROSOL, METERED RESPIRATORY (INHALATION) EVERY 4 HOURS PRN
Qty: 1 EACH | Refills: 1 | Status: SHIPPED | OUTPATIENT
Start: 2022-08-23

## 2023-08-04 ENCOUNTER — OFFICE VISIT (OUTPATIENT)
Dept: FAMILY MEDICINE CLINIC | Facility: CLINIC | Age: 48
End: 2023-08-04

## 2023-08-04 ENCOUNTER — LAB ENCOUNTER (OUTPATIENT)
Dept: LAB | Age: 48
End: 2023-08-04
Attending: FAMILY MEDICINE
Payer: COMMERCIAL

## 2023-08-04 VITALS
TEMPERATURE: 99 F | RESPIRATION RATE: 16 BRPM | SYSTOLIC BLOOD PRESSURE: 136 MMHG | BODY MASS INDEX: 26.43 KG/M2 | HEART RATE: 82 BPM | DIASTOLIC BLOOD PRESSURE: 78 MMHG | HEIGHT: 61 IN | WEIGHT: 140 LBS

## 2023-08-04 DIAGNOSIS — L30.9 ECZEMA, UNSPECIFIED TYPE: ICD-10-CM

## 2023-08-04 DIAGNOSIS — Z01.419 ENCOUNTER FOR ANNUAL ROUTINE GYNECOLOGICAL EXAMINATION: ICD-10-CM

## 2023-08-04 DIAGNOSIS — Z00.00 ROUTINE PHYSICAL EXAMINATION: ICD-10-CM

## 2023-08-04 DIAGNOSIS — J45.20 MILD INTERMITTENT ASTHMA WITHOUT COMPLICATION: ICD-10-CM

## 2023-08-04 DIAGNOSIS — Z12.31 ENCOUNTER FOR SCREENING MAMMOGRAM FOR MALIGNANT NEOPLASM OF BREAST: Primary | ICD-10-CM

## 2023-08-04 LAB
ALBUMIN SERPL-MCNC: 3.5 G/DL (ref 3.4–5)
ALBUMIN/GLOB SERPL: 1 {RATIO} (ref 1–2)
ALP LIVER SERPL-CCNC: 48 U/L
ALT SERPL-CCNC: 13 U/L
ANION GAP SERPL CALC-SCNC: 5 MMOL/L (ref 0–18)
AST SERPL-CCNC: 9 U/L (ref 15–37)
BILIRUB SERPL-MCNC: 0.3 MG/DL (ref 0.1–2)
BUN BLD-MCNC: 8 MG/DL (ref 7–18)
BUN/CREAT SERPL: 10.5 (ref 10–20)
CALCIUM BLD-MCNC: 8.5 MG/DL (ref 8.5–10.1)
CHLORIDE SERPL-SCNC: 109 MMOL/L (ref 98–112)
CHOLEST SERPL-MCNC: 176 MG/DL (ref ?–200)
CO2 SERPL-SCNC: 28 MMOL/L (ref 21–32)
CREAT BLD-MCNC: 0.76 MG/DL
DEPRECATED RDW RBC AUTO: 45.4 FL (ref 35.1–46.3)
EGFRCR SERPLBLD CKD-EPI 2021: 98 ML/MIN/1.73M2 (ref 60–?)
ERYTHROCYTE [DISTWIDTH] IN BLOOD BY AUTOMATED COUNT: 14.9 % (ref 11–15)
FASTING PATIENT LIPID ANSWER: YES
FASTING STATUS PATIENT QL REPORTED: YES
GLOBULIN PLAS-MCNC: 3.5 G/DL (ref 2.8–4.4)
GLUCOSE BLD-MCNC: 86 MG/DL (ref 70–99)
HCT VFR BLD AUTO: 33.1 %
HDLC SERPL-MCNC: 75 MG/DL (ref 40–59)
HGB BLD-MCNC: 10.5 G/DL
LDLC SERPL CALC-MCNC: 88 MG/DL (ref ?–100)
MCH RBC QN AUTO: 26.6 PG (ref 26–34)
MCHC RBC AUTO-ENTMCNC: 31.7 G/DL (ref 31–37)
MCV RBC AUTO: 84 FL
NONHDLC SERPL-MCNC: 101 MG/DL (ref ?–130)
OSMOLALITY SERPL CALC.SUM OF ELEC: 292 MOSM/KG (ref 275–295)
PLATELET # BLD AUTO: 196 10(3)UL (ref 150–450)
POTASSIUM SERPL-SCNC: 3.5 MMOL/L (ref 3.5–5.1)
PROT SERPL-MCNC: 7 G/DL (ref 6.4–8.2)
RBC # BLD AUTO: 3.94 X10(6)UL
SODIUM SERPL-SCNC: 142 MMOL/L (ref 136–145)
TRIGL SERPL-MCNC: 67 MG/DL (ref 30–149)
TSI SER-ACNC: 1.97 MIU/ML (ref 0.36–3.74)
VLDLC SERPL CALC-MCNC: 11 MG/DL (ref 0–30)
WBC # BLD AUTO: 4.2 X10(3) UL (ref 4–11)

## 2023-08-04 PROCEDURE — 87591 N.GONORRHOEAE DNA AMP PROB: CPT | Performed by: FAMILY MEDICINE

## 2023-08-04 PROCEDURE — 80061 LIPID PANEL: CPT

## 2023-08-04 PROCEDURE — 84443 ASSAY THYROID STIM HORMONE: CPT

## 2023-08-04 PROCEDURE — 80053 COMPREHEN METABOLIC PANEL: CPT

## 2023-08-04 PROCEDURE — 87491 CHLMYD TRACH DNA AMP PROBE: CPT | Performed by: FAMILY MEDICINE

## 2023-08-04 PROCEDURE — 87389 HIV-1 AG W/HIV-1&-2 AB AG IA: CPT

## 2023-08-04 PROCEDURE — 86780 TREPONEMA PALLIDUM: CPT

## 2023-08-04 PROCEDURE — 85027 COMPLETE CBC AUTOMATED: CPT

## 2023-08-04 PROCEDURE — 36415 COLL VENOUS BLD VENIPUNCTURE: CPT

## 2023-08-07 LAB
C TRACH DNA SPEC QL NAA+PROBE: NEGATIVE
N GONORRHOEA DNA SPEC QL NAA+PROBE: NEGATIVE
T PALLIDUM AB SER QL: NEGATIVE

## 2023-08-25 LAB
HPV I/H RISK 1 DNA SPEC QL NAA+PROBE: NEGATIVE
LAST PAP RESULT: NORMAL
PAP HISTORY (OTHER THAN LAST PAP): NORMAL

## 2023-10-02 ENCOUNTER — HOSPITAL ENCOUNTER (OUTPATIENT)
Dept: MAMMOGRAPHY | Age: 48
Discharge: HOME | End: 2023-10-02
Attending: FAMILY MEDICINE
Payer: COMMERCIAL

## 2023-10-02 DIAGNOSIS — Z12.31 ENCOUNTER FOR SCREENING MAMMOGRAM FOR MALIGNANT NEOPLASM OF BREAST: ICD-10-CM

## 2023-10-02 PROCEDURE — 77063 BREAST TOMOSYNTHESIS BI: CPT | Performed by: FAMILY MEDICINE

## 2023-10-02 PROCEDURE — 77067 SCR MAMMO BI INCL CAD: CPT | Performed by: FAMILY MEDICINE

## 2023-11-24 RX ORDER — MONTELUKAST SODIUM 10 MG/1
10 TABLET ORAL NIGHTLY
Qty: 90 TABLET | Refills: 3 | Status: SHIPPED | OUTPATIENT
Start: 2023-11-24

## 2023-11-24 RX ORDER — MONTELUKAST SODIUM 10 MG/1
10 TABLET ORAL NIGHTLY
Qty: 90 TABLET | Refills: 3 | OUTPATIENT
Start: 2023-11-24

## 2023-11-24 NOTE — TELEPHONE ENCOUNTER
Refill passed per [facility] protocol.   Requested Prescriptions   Pending Prescriptions Disp Refills    MONTELUKAST 10 MG Oral Tab [Pharmacy Med Name: MONTELUKAST 10MG TABLETS] 90 tablet 1     Sig: TAKE 1 TABLET(10 MG) BY MOUTH EVERY NIGHT       Asthma & COPD Medication Protocol Passed - 11/24/2023  4:01 PM        Passed - In person appointment or virtual visit in the past 6 mos or appointment in next 3 mos     Recent Outpatient Visits              3 months ago Encounter for screening mammogram for malignant neoplasm of breast    [facility] Weiler, Colleen M, DO    Office Visit    1 year ago Chronic cough    [facility] [place] Weiler, Colleen M, DO    Office Visit    1 year ago Screen for colon cancer    [facility]    Nurse Only    1 year ago Vaginal discharge    [facility] Weiler, Colleen M, DO    Office Visit    1 year ago Encounter for screening colonoscopy    [facility], Colleen M, DO    Office Visit

## 2023-11-25 NOTE — TELEPHONE ENCOUNTER
Duplicate request, previously addressed. Disp Refills Start End    montelukast 10 MG Oral Tab 90 tablet 3 11/24/2023     Sig - Route: Take 1 tablet (10 mg total) by mouth nightly.  - Oral    Sent to pharmacy as: Montelukast Sodium 10 MG Oral Tablet (Singulair)    E-Prescribing Status: Receipt confirmed by pharmacy (11/24/2023  4:06 PM CST)      Pharmacy    [address], [phone], [phone]

## 2024-08-29 ENCOUNTER — OFFICE VISIT (OUTPATIENT)
Dept: FAMILY MEDICINE CLINIC | Facility: CLINIC | Age: 49
End: 2024-08-29
Payer: MEDICAID

## 2024-08-29 ENCOUNTER — LAB ENCOUNTER (OUTPATIENT)
Dept: LAB | Age: 49
End: 2024-08-29
Attending: FAMILY MEDICINE
Payer: MEDICAID

## 2024-08-29 ENCOUNTER — TELEPHONE (OUTPATIENT)
Dept: FAMILY MEDICINE CLINIC | Facility: CLINIC | Age: 49
End: 2024-08-29

## 2024-08-29 VITALS
HEIGHT: 61.5 IN | BODY MASS INDEX: 25.16 KG/M2 | HEART RATE: 78 BPM | SYSTOLIC BLOOD PRESSURE: 110 MMHG | TEMPERATURE: 98 F | RESPIRATION RATE: 16 BRPM | WEIGHT: 135 LBS | DIASTOLIC BLOOD PRESSURE: 70 MMHG

## 2024-08-29 DIAGNOSIS — Z12.11 COLON CANCER SCREENING: ICD-10-CM

## 2024-08-29 DIAGNOSIS — Z12.31 ENCOUNTER FOR SCREENING MAMMOGRAM FOR MALIGNANT NEOPLASM OF BREAST: ICD-10-CM

## 2024-08-29 DIAGNOSIS — Z12.11 SCREENING FOR COLON CANCER: ICD-10-CM

## 2024-08-29 DIAGNOSIS — Z00.00 ROUTINE PHYSICAL EXAMINATION: ICD-10-CM

## 2024-08-29 DIAGNOSIS — Z00.00 ROUTINE PHYSICAL EXAMINATION: Primary | ICD-10-CM

## 2024-08-29 DIAGNOSIS — D50.9 IRON DEFICIENCY ANEMIA, UNSPECIFIED IRON DEFICIENCY ANEMIA TYPE: Primary | ICD-10-CM

## 2024-08-29 LAB
ALBUMIN SERPL-MCNC: 4.3 G/DL (ref 3.2–4.8)
ALBUMIN/GLOB SERPL: 1.4 {RATIO} (ref 1–2)
ALP LIVER SERPL-CCNC: 44 U/L
ALT SERPL-CCNC: <7 U/L
ANION GAP SERPL CALC-SCNC: 8 MMOL/L (ref 0–18)
AST SERPL-CCNC: 14 U/L (ref ?–34)
BILIRUB SERPL-MCNC: 0.5 MG/DL (ref 0.3–1.2)
BUN BLD-MCNC: 9 MG/DL (ref 9–23)
BUN/CREAT SERPL: 13.2 (ref 10–20)
CALCIUM BLD-MCNC: 9.3 MG/DL (ref 8.7–10.4)
CHLORIDE SERPL-SCNC: 109 MMOL/L (ref 98–112)
CHOLEST SERPL-MCNC: 184 MG/DL (ref ?–200)
CO2 SERPL-SCNC: 25 MMOL/L (ref 21–32)
CREAT BLD-MCNC: 0.68 MG/DL
DEPRECATED HBV CORE AB SER IA-ACNC: 3.6 NG/ML
DEPRECATED RDW RBC AUTO: 49.5 FL (ref 35.1–46.3)
EGFRCR SERPLBLD CKD-EPI 2021: 108 ML/MIN/1.73M2 (ref 60–?)
ERYTHROCYTE [DISTWIDTH] IN BLOOD BY AUTOMATED COUNT: 22.8 % (ref 11–15)
FASTING PATIENT LIPID ANSWER: YES
FASTING STATUS PATIENT QL REPORTED: YES
GLOBULIN PLAS-MCNC: 3 G/DL (ref 2–3.5)
GLUCOSE BLD-MCNC: 79 MG/DL (ref 70–99)
HCT VFR BLD AUTO: 22.6 %
HDLC SERPL-MCNC: 64 MG/DL (ref 40–59)
HGB BLD-MCNC: 6.1 G/DL
IRON SATN MFR SERPL: 2 %
IRON SERPL-MCNC: 8 UG/DL
LDLC SERPL CALC-MCNC: 108 MG/DL (ref ?–100)
MCH RBC QN AUTO: 17.3 PG (ref 26–34)
MCHC RBC AUTO-ENTMCNC: 27 G/DL (ref 31–37)
MCV RBC AUTO: 64 FL
NONHDLC SERPL-MCNC: 120 MG/DL (ref ?–130)
OSMOLALITY SERPL CALC.SUM OF ELEC: 292 MOSM/KG (ref 275–295)
PLATELET # BLD AUTO: 363 10(3)UL (ref 150–450)
PLATELETS.RETICULATED NFR BLD AUTO: 7.9 % (ref 0–7)
POTASSIUM SERPL-SCNC: 3.5 MMOL/L (ref 3.5–5.1)
PROT SERPL-MCNC: 7.3 G/DL (ref 5.7–8.2)
RBC # BLD AUTO: 3.53 X10(6)UL
SODIUM SERPL-SCNC: 142 MMOL/L (ref 136–145)
TIBC SERPL-MCNC: 460 UG/DL (ref 250–425)
TRANSFERRIN SERPL-MCNC: 309 MG/DL (ref 250–380)
TRIGL SERPL-MCNC: 61 MG/DL (ref 30–149)
TSI SER-ACNC: 1.06 MIU/ML (ref 0.55–4.78)
VLDLC SERPL CALC-MCNC: 10 MG/DL (ref 0–30)
WBC # BLD AUTO: 4.6 X10(3) UL (ref 4–11)

## 2024-08-29 PROCEDURE — 36415 COLL VENOUS BLD VENIPUNCTURE: CPT

## 2024-08-29 PROCEDURE — 84443 ASSAY THYROID STIM HORMONE: CPT

## 2024-08-29 PROCEDURE — 85060 BLOOD SMEAR INTERPRETATION: CPT

## 2024-08-29 PROCEDURE — 84466 ASSAY OF TRANSFERRIN: CPT

## 2024-08-29 PROCEDURE — 82728 ASSAY OF FERRITIN: CPT

## 2024-08-29 PROCEDURE — 80053 COMPREHEN METABOLIC PANEL: CPT

## 2024-08-29 PROCEDURE — 85027 COMPLETE CBC AUTOMATED: CPT

## 2024-08-29 PROCEDURE — 99396 PREV VISIT EST AGE 40-64: CPT | Performed by: FAMILY MEDICINE

## 2024-08-29 PROCEDURE — 83540 ASSAY OF IRON: CPT

## 2024-08-29 PROCEDURE — 80061 LIPID PANEL: CPT

## 2024-08-29 RX ORDER — ALBUTEROL SULFATE 90 UG/1
2 AEROSOL, METERED RESPIRATORY (INHALATION) EVERY 4 HOURS PRN
Qty: 1 EACH | Refills: 1 | Status: SHIPPED | OUTPATIENT
Start: 2024-08-29

## 2024-08-29 RX ORDER — HYDROCORTISONE 2.5 %
1 CREAM (GRAM) TOPICAL 2 TIMES DAILY
Qty: 30 G | Refills: 1 | Status: SHIPPED | OUTPATIENT
Start: 2024-08-29

## 2024-08-29 RX ORDER — FLUTICASONE PROPIONATE 110 UG/1
2 AEROSOL, METERED RESPIRATORY (INHALATION) 2 TIMES DAILY
Qty: 1 EACH | Refills: 3 | Status: SHIPPED | OUTPATIENT
Start: 2024-08-29

## 2024-08-29 NOTE — TELEPHONE ENCOUNTER
Left URGENT  Voicemail for patient to call back our office. Office phone number provided with office telephone hours.       Left Voicemail for Meghan , patient sister  on Release of Information  to reach the patient     Office phone number provided with office telephone hours.

## 2024-08-29 NOTE — PROGRESS NOTES
HPI:  49 yr old female who presents for physical. 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  Needs to start exercising. Eating healthy. Has been eating more ice. Has been anemic before.     Asthma is well-controlled.     Eczema is present under arms.  Uses Hydrocortisone but ran out.     Cycles are regular.  Not heavier. She does have more discharge with cycles. Next pap 2028.     PMHx: reviewed, see chart  PSHx: reviewed, see chart  All: Shellfish   Meds: see chart    ROS:   Allergic/Immuno:  Negative for environmental allergies and food allergies  Cardiovascular:  Negative for chest pain and irregular heartbeat/palpitations  Constitutional:  Negative for decreased activity, fever, irritability and lethargy  Endocrine:  Negative for abnormal sleep patterns, increased activity, polydipsia and polyphagia  ENMT:  Negative for ear drainage, hearing loss and nasal drainage  Eyes:  Negative for eye discharge and vision loss  Gastrointestinal:  Negative for abdominal pain, constipation, decreased appetite, diarrhea and vomiting  Genitourinary:  Negative for dysuria and hematuria  Hema/Lymph:  Negative for easy bleeding and easy bruising  Integumentary:  Negative for pruritus and rash  Musculoskeletal:  Negative for bone/joint symptoms  Neurological:  Negative for gait disturbance  Psychiatric:  Negative for inappropriate interaction and psychiatric symptoms    PE:  /70   Pulse 78   Temp 98.1 °F (36.7 °C) (Temporal)   Resp 16   Ht 5' 1.5\" (1.562 m)   Wt 135 lb (61.2 kg)   LMP 08/18/2024   BMI 25.09 kg/m²   Gen:  Well-nourished.  No distress.  HEENT: Conjunctive clear.  Jerzy ear canals clear.  Jerzy TMs intact with good landmarks noted.  Nares patent.  Oral mucous membrane moist.  Normal lips, teeth, and gums.  Oropharynx normal.  Neck supple.  Good ROM.  No LAD.  Thyroid normal.  CV:  Regular rate and rhythm; no murmurs  Lungs:  Clear to ausculation; good aeration               No wheezes, rales or  rhonchi  Abd: soft, non-tender, non-distended          Normal bowel sounds; no masses          No hepatosplenomegaly  Breasts:  Normal appearance bilateral.  No masses or lesions noted.  Normal nipples bilateral.  No nipple discharge noted.  Normal lymph nodes.  Extremities: No cyanosis, clubbing, edema.  Pedal pulses 2+ sofi.  MSK:  No abnormalities.  Skin: Warm/dry/intact.  No abnormal moles/lesions noted.  No rashes.  Psych: Orientated to person, place, and time.  Behavior and affect appropriate for age.    A/P:  Encounter Diagnoses   Name Primary?    Routine physical examination    Encouraged exercise and healthy diet.  Labs done.  Declined pneumonia vaccine   Yes    Encounter for screening mammogram for malignant neoplasm of breast     Mammogram ordered.  Will call with results.       Colon cancer screening    Refer to GI to schedule.      Colleen Weiler, DO

## 2024-08-29 NOTE — TELEPHONE ENCOUNTER
Dr Weiler, FYI    The patient called back and was informed to go to the nearest ER to her location to have the HGB lab addressed. Patient agrees with plan.    Do you want to sign referrals for the future for hematology and Gastro?

## 2024-08-29 NOTE — TELEPHONE ENCOUNTER
I think she needs to go to the ER as she may need a transfusion or at least, an iron infusion.  We will also need her to see hematology in the future.  ALso, please encourage her to schedule colonoscopy soon

## 2024-08-29 NOTE — TELEPHONE ENCOUNTER
Mac benedict from [facility] with critical results     Hemoglobin 6.1    Please advise and thank you.      belem critical data CBC, Platelet, No Differential [E]  Order: [id number]   Collected 8/29/2024 10:23 AM       Status: Preliminary result       Dx: Routine physical examination    0 Result Notes      Component  Ref Range & Units 8/29/24 10:23 AM   WBC  4.0 - 11.0 x10(3) uL 4.6 P   RBC  3.80 - 5.30 x10(6)uL 3.53 Low  P   HGB  12.0 - 16.0 g/dL 6.1 Low Panic  P   HCT  35.0 - 48.0 % 22.6 Low  P   MCV  80.0 - 100.0 fL 64.0 Low  P   MCH  26.0 - 34.0 pg 17.3 Low  P   MCHC  31.0 - 37.0 g/dL 27.0 Low  P   RDW  11.0 - 15.0 % 22.8 High  P   RDW-SD  35.1 - 46.3 fL 49.5 High  P

## 2024-08-30 NOTE — TELEPHONE ENCOUNTER
Patient calling back today to inform Dr. Weiler that she did go to the ER and was given 2 pints of blood. She is feeling good and requested a follow up. Scheduled for Wednesday 9/4/24 at 10 am.

## 2024-09-04 ENCOUNTER — LAB ENCOUNTER (OUTPATIENT)
Dept: LAB | Age: 49
End: 2024-09-04
Attending: FAMILY MEDICINE
Payer: COMMERCIAL

## 2024-09-04 ENCOUNTER — OFFICE VISIT (OUTPATIENT)
Dept: FAMILY MEDICINE CLINIC | Facility: CLINIC | Age: 49
End: 2024-09-04
Payer: COMMERCIAL

## 2024-09-04 VITALS
SYSTOLIC BLOOD PRESSURE: 110 MMHG | WEIGHT: 136 LBS | TEMPERATURE: 98 F | RESPIRATION RATE: 16 BRPM | DIASTOLIC BLOOD PRESSURE: 67 MMHG | HEART RATE: 64 BPM | BODY MASS INDEX: 25 KG/M2

## 2024-09-04 DIAGNOSIS — D50.0 IRON DEFICIENCY ANEMIA DUE TO CHRONIC BLOOD LOSS: Primary | ICD-10-CM

## 2024-09-04 DIAGNOSIS — D50.0 IRON DEFICIENCY ANEMIA DUE TO CHRONIC BLOOD LOSS: ICD-10-CM

## 2024-09-04 DIAGNOSIS — D21.9 FIBROIDS: ICD-10-CM

## 2024-09-04 LAB
DEPRECATED RDW RBC AUTO: 70.9 FL (ref 35.1–46.3)
ERYTHROCYTE [DISTWIDTH] IN BLOOD BY AUTOMATED COUNT: 29.2 % (ref 11–15)
HCT VFR BLD AUTO: 33.3 %
HGB BLD-MCNC: 9.5 G/DL
MCH RBC QN AUTO: 20.5 PG (ref 26–34)
MCHC RBC AUTO-ENTMCNC: 28.5 G/DL (ref 31–37)
MCV RBC AUTO: 71.9 FL
PLATELET # BLD AUTO: 193 10(3)UL (ref 150–450)
PLATELETS.RETICULATED NFR BLD AUTO: 9.3 % (ref 0–7)
RBC # BLD AUTO: 4.63 X10(6)UL
WBC # BLD AUTO: 5.2 X10(3) UL (ref 4–11)

## 2024-09-04 PROCEDURE — 3078F DIAST BP <80 MM HG: CPT | Performed by: FAMILY MEDICINE

## 2024-09-04 PROCEDURE — 99213 OFFICE O/P EST LOW 20 MIN: CPT | Performed by: FAMILY MEDICINE

## 2024-09-04 PROCEDURE — 36415 COLL VENOUS BLD VENIPUNCTURE: CPT

## 2024-09-04 PROCEDURE — 85027 COMPLETE CBC AUTOMATED: CPT

## 2024-09-04 PROCEDURE — 3074F SYST BP LT 130 MM HG: CPT | Performed by: FAMILY MEDICINE

## 2024-09-04 RX ORDER — FERROUS SULFATE 325(65) MG
325 TABLET, DELAYED RELEASE (ENTERIC COATED) ORAL
COMMUNITY

## 2024-09-04 NOTE — PROGRESS NOTES
HPI: Briana is a 49 year old female who presents for anemia. Hemoglobin 6.1 last week so asked to proceed to ER.  Went to [facility] in [place], [place].  Transfused 2 units in ER. Pt has had very heavy periods all her life.  Had ultrasound in the ER and she was shown to have fibroids. Taking iron supplements now. Feels better now. Eating less ice.     PMH:    Past Medical History:    Asthma (HCC)    Extrinsic asthma, unspecified    since childhood      Alg:  Shellfish   Meds:   Current Outpatient Medications on File Prior to Visit   Medication Sig Dispense Refill    ferrous sulfate 325 (65 FE) MG Oral Tab EC Take 1 tablet (325 mg total) by mouth daily with breakfast.      hydrocortisone 2.5 % External Cream Apply 1 Application topically 2 (two) times daily. 30 g 1    albuterol (PROAIR HFA) 108 (90 Base) MCG/ACT Inhalation Aero Soln Inhale 2 puffs into the lungs every 4 (four) hours as needed for Wheezing. 1 each 1    fluticasone propionate 110 MCG/ACT Inhalation Aerosol Inhale 2 puffs into the lungs 2 (two) times daily. 1 each 3    montelukast 10 MG Oral Tab Take 1 tablet (10 mg total) by mouth nightly. 90 tablet 3    Multiple Vitamins-Minerals (MULTIVITAL) Oral Tab Take 1 tablet by mouth daily.       No current facility-administered medications on file prior to visit.      Tobacco Use: no    ROS: see HPI    Objective:   Gen: AOx3. NAD.   /67   Pulse 64   Temp 97.6 °F (36.4 °C) (Temporal)   Resp 16   Wt 136 lb (61.7 kg)   LMP 08/18/2024   BMI 25.28 kg/m²       Assessment:/Plan:  Encounter Diagnoses   Name Primary?    Iron deficiency anemia due to chronic blood loss Yes    Fibroids        S/P 2 units of blood.  Feels better.  Taking iron supplements.  Refer to Gyne to discuss fibroids.  Refer to GI so she obtains colonoscopy to rule out malignancy.  Refer to Hematology for anemia management.  ?iron infusions.     Colleen Weiler, DO

## 2024-09-05 ENCOUNTER — NURSE ONLY (OUTPATIENT)
Facility: CLINIC | Age: 49
End: 2024-09-05

## 2024-09-05 NOTE — PROGRESS NOTES
Called and spoke to patient. Patient had placed irregular bowel movements. Patient states since she had a blood transfusion on 8/29 and her last CBC was 9.5. Advised she would need an appointment.    Appointment made    Future Appointments   Date Time Provider Department Center   10/4/2024  7:40 AM [address]   10/17/2024  7:30 AM Nicole Starks APRN [facility]

## 2024-09-25 ENCOUNTER — TELEPHONE (OUTPATIENT)
Dept: OBGYN CLINIC | Facility: CLINIC | Age: 49
End: 2024-09-25

## 2024-09-25 NOTE — TELEPHONE ENCOUNTER
Patient called to request appointment with provider and per patient insurance is out-of-network it is a 246 and patient was informed and was provided with proper network number [id number] and was suggested to call insurance to change the network number and patient agreed and will be calling back to schedule. No other action needed.

## 2024-10-04 ENCOUNTER — HOSPITAL ENCOUNTER (OUTPATIENT)
Dept: MAMMOGRAPHY | Age: 49
Discharge: HOME | End: 2024-10-04
Attending: FAMILY MEDICINE
Payer: COMMERCIAL

## 2024-10-04 DIAGNOSIS — Z80.3 FAMILY HISTORY OF BREAST CANCER: Primary | ICD-10-CM

## 2024-10-04 DIAGNOSIS — Z12.31 ENCOUNTER FOR SCREENING MAMMOGRAM FOR MALIGNANT NEOPLASM OF BREAST: ICD-10-CM

## 2024-10-04 PROCEDURE — 77067 SCR MAMMO BI INCL CAD: CPT | Performed by: FAMILY MEDICINE

## 2024-10-04 PROCEDURE — 77063 BREAST TOMOSYNTHESIS BI: CPT | Performed by: FAMILY MEDICINE

## 2024-10-17 ENCOUNTER — LAB ENCOUNTER (OUTPATIENT)
Dept: LAB | Facility: HOSPITAL | Age: 49
End: 2024-10-17
Attending: NURSE PRACTITIONER
Payer: COMMERCIAL

## 2024-10-17 ENCOUNTER — TELEPHONE (OUTPATIENT)
Facility: CLINIC | Age: 49
End: 2024-10-17

## 2024-10-17 ENCOUNTER — OFFICE VISIT (OUTPATIENT)
Facility: CLINIC | Age: 49
End: 2024-10-17
Payer: COMMERCIAL

## 2024-10-17 VITALS
HEIGHT: 61 IN | DIASTOLIC BLOOD PRESSURE: 77 MMHG | SYSTOLIC BLOOD PRESSURE: 130 MMHG | WEIGHT: 129 LBS | HEART RATE: 65 BPM | BODY MASS INDEX: 24.35 KG/M2

## 2024-10-17 DIAGNOSIS — D50.9 IRON DEFICIENCY ANEMIA, UNSPECIFIED IRON DEFICIENCY ANEMIA TYPE: ICD-10-CM

## 2024-10-17 DIAGNOSIS — Z12.11 SCREENING FOR COLON CANCER: Primary | ICD-10-CM

## 2024-10-17 DIAGNOSIS — D50.9 IRON DEFICIENCY ANEMIA, UNSPECIFIED IRON DEFICIENCY ANEMIA TYPE: Primary | ICD-10-CM

## 2024-10-17 DIAGNOSIS — Z12.11 SCREENING FOR COLON CANCER: ICD-10-CM

## 2024-10-17 LAB
BASOPHILS # BLD AUTO: 0.04 X10(3) UL (ref 0–0.2)
BASOPHILS NFR BLD AUTO: 0.9 %
DEPRECATED HBV CORE AB SER IA-ACNC: 20 NG/ML
DEPRECATED RDW RBC AUTO: 68 FL (ref 35.1–46.3)
EOSINOPHIL # BLD AUTO: 0.11 X10(3) UL (ref 0–0.7)
EOSINOPHIL NFR BLD AUTO: 2.4 %
ERYTHROCYTE [DISTWIDTH] IN BLOOD BY AUTOMATED COUNT: 24 % (ref 11–15)
HCT VFR BLD AUTO: 35 %
HGB BLD-MCNC: 10.8 G/DL
IMM GRANULOCYTES # BLD AUTO: 0.01 X10(3) UL (ref 0–1)
IMM GRANULOCYTES NFR BLD: 0.2 %
IRON SATN MFR SERPL: 9 %
IRON SERPL-MCNC: 29 UG/DL
LYMPHOCYTES # BLD AUTO: 1.44 X10(3) UL (ref 1–4)
LYMPHOCYTES NFR BLD AUTO: 31 %
MCH RBC QN AUTO: 24.7 PG (ref 26–34)
MCHC RBC AUTO-ENTMCNC: 30.9 G/DL (ref 31–37)
MCV RBC AUTO: 80.1 FL
MONOCYTES # BLD AUTO: 0.37 X10(3) UL (ref 0.1–1)
MONOCYTES NFR BLD AUTO: 8 %
NEUTROPHILS # BLD AUTO: 2.68 X10 (3) UL (ref 1.5–7.7)
NEUTROPHILS # BLD AUTO: 2.68 X10(3) UL (ref 1.5–7.7)
NEUTROPHILS NFR BLD AUTO: 57.5 %
PLATELET # BLD AUTO: 190 10(3)UL (ref 150–450)
PLATELET MORPHOLOGY: NORMAL
PLATELETS.RETICULATED NFR BLD AUTO: 7.8 % (ref 0–7)
RBC # BLD AUTO: 4.37 X10(6)UL
TIBC SERPL-MCNC: 319 UG/DL (ref 250–425)
TRANSFERRIN SERPL-MCNC: 214 MG/DL (ref 250–380)
WBC # BLD AUTO: 4.7 X10(3) UL (ref 4–11)

## 2024-10-17 PROCEDURE — 85025 COMPLETE CBC W/AUTO DIFF WBC: CPT

## 2024-10-17 PROCEDURE — 3008F BODY MASS INDEX DOCD: CPT | Performed by: NURSE PRACTITIONER

## 2024-10-17 PROCEDURE — S0285 CNSLT BEFORE SCREEN COLONOSC: HCPCS | Performed by: NURSE PRACTITIONER

## 2024-10-17 PROCEDURE — 83540 ASSAY OF IRON: CPT

## 2024-10-17 PROCEDURE — 84466 ASSAY OF TRANSFERRIN: CPT

## 2024-10-17 PROCEDURE — 82728 ASSAY OF FERRITIN: CPT

## 2024-10-17 PROCEDURE — 36415 COLL VENOUS BLD VENIPUNCTURE: CPT

## 2024-10-17 PROCEDURE — 3075F SYST BP GE 130 - 139MM HG: CPT | Performed by: NURSE PRACTITIONER

## 2024-10-17 PROCEDURE — 3078F DIAST BP <80 MM HG: CPT | Performed by: NURSE PRACTITIONER

## 2024-10-17 NOTE — TELEPHONE ENCOUNTER
Scheduled for:  Colonoscopy 38188 EGD 26821  Provider Name:  Dr Cid  Date:  10/23/2024  Location:  [facility]  Sedation:  MAC  Time:  (pt is aware that ENDO will call the day before the procedure to confirm arrival time)  Prep:  Golytely  Meds/Allergies Reconciled?:  MICHAEL Pena reviewed  Diagnosis with codes:    Anemia D50.9  CRC screening Z12.11  Was patient informed to call insurance with codes (Y/N):  Yes  Referral sent?:  Referral was sent at the time of electronic surgical scheduling.  [facility] or [facility] notified?:  I sent an electronic request to Endo Scheduling and received a confirmation today.  Medication Orders:  Patient is aware to NOT take iron pills, herbal meds and diet supplements for 7 days before exam. Also to NOT take any form of alcohol, recreational drugs and any forms of ED meds 24-72 hours before exam.   Misc Orders:  N/A   Further instructions given by staff:  I discussed the prep instructions with the patient which she verbally understood. I provided patient with prep instruction's sheet in office.      Patient was informed about the new cancellation policy for his/her procedure. Patient was also given a copy of the cancellation policy at the time of the appointment and verbalized understanding.

## 2024-10-17 NOTE — H&P
[facility] - Gastroenterology                                                                                                  Clinic History and Physical     Chief Complaint   Patient presents with    Consult     Colonoscopy      Anemia       Requesting physician or provider: Colleen Weiler, DO    HPI:   Briana Lala is a 49 year old year-old female with history of urine fibroids, menorrhagia, anemia. The patient presents for colon cancer screening evaluation.    Patient with history of menorrhagia.  Hemoglobin down to 6.1 on labs with PCP August 2024. Was given 2 units of blood in ER. Is working with gyne on treatment and possible iron infusions. Has felt a lot less fatigued since starting oral iron.     Lab Results   Component Value Date    WBC 5.2 09/04/2024    RBC 4.63 09/04/2024    HGB 9.5 (L) 09/04/2024    HCT 33.3 (L) 09/04/2024    MCV 71.9 (L) 09/04/2024    MCH 20.5 (L) 09/04/2024    MCHC 28.5 (L) 09/04/2024    RDW 29.2 (H) 09/04/2024    .0 09/04/2024    MPV 10.8 (H) 03/26/2018      Ferritin  50.0 - 306.0 ng/mL 3.6 Low      Iron  50 - 170 ug/dL 8 Low    Transferrin  250 - 380 mg/dL 309   Total Iron Binding Capacity  250 - 425 ug/dL 460 High    % Saturation  15 - 50 % 2 Low        Patient is here today as a referral from her PCP for evaluation prior to undergoing colonoscopy for CRC screening. Patient denies any GI symptoms of nausea, vomiting, heartburn, dyspepsia, dysphagia, hematemesis, abdominal pain, change in bowel habits, constipation, diarrhea, hematochezia, or melena.  Additionally there is no unintentional weight loss.    Pt is due for CRC screening. We discussed the available screening options for CRC such as FIT and cologuard. They are electing to pursue colonoscopy at this time.    Last colonoscopy: none  Last EGD: none    NSAIDS: none  Tobacco: none  Alcohol: none  Marijuana: none  Illicit drugs: none    FH GI malignancy:  paternal grandmother pancreatic cancer     No history  of adverse reaction to sedation  No DAVID  No anticoagulants/antiplatelet  No pacemaker/defibrillator  No pain medications and/or sleep aides    Wt Readings from Last 6 Encounters:   10/17/24 129 lb (58.5 kg)   24 136 lb (61.7 kg)   24 135 lb (61.2 kg)   23 140 lb (63.5 kg)   22 141 lb (64 kg)   22 141 lb (64 kg)          History, Medications, Allergies, ROS:      Past Medical History:    Asthma (HCC)    Extrinsic asthma, unspecified    since childhood      Past Surgical History:   Procedure Laterality Date      ,     Tubal ligation  2016      Family Hx:   Family History   Problem Relation Age of Onset    Hypertension Mother     Hypertension Father     Pancreatic Cancer Paternal Grandmother 77    Cancer Paternal Grandmother 77        pancreatic    Breast Cancer Neg     Ovarian Cancer Neg       Social History:   Social History     Socioeconomic History    Marital status: Single   Tobacco Use    Smoking status: Never     Passive exposure: Never    Smokeless tobacco: Never   Vaping Use    Vaping status: Never Used   Substance and Sexual Activity    Alcohol use: No    Drug use: No        Medications (Active prior to today's visit):  Current Outpatient Medications   Medication Sig Dispense Refill    polyethylene glycol, PEG 3350-KCl-NaBcb-NaCl-NaSulf, 236 g Oral Recon Soln Take 4,000 mL by mouth As Directed. Take 2,000 mL the night before your procedure and 2,000 mL the morning of your procedure. 1 each 0    ferrous sulfate 325 (65 FE) MG Oral Tab EC Take 1 tablet (325 mg total) by mouth daily with breakfast.      hydrocortisone 2.5 % External Cream Apply 1 Application topically 2 (two) times daily. 30 g 1    albuterol (PROAIR HFA) 108 (90 Base) MCG/ACT Inhalation Aero Soln Inhale 2 puffs into the lungs every 4 (four) hours as needed for Wheezing. 1 each 1    fluticasone propionate 110 MCG/ACT Inhalation Aerosol Inhale 2 puffs into the lungs 2 (two) times daily. 1 each 3     montelukast 10 MG Oral Tab Take 1 tablet (10 mg total) by mouth nightly. 90 tablet 3    Multiple Vitamins-Minerals (MULTIVITAL) Oral Tab Take 1 tablet by mouth daily.         Allergies:  Allergies[1]    ROS:   CONSTITUTIONAL: negative for fevers, chills, sweats  EYES Negative for scleral icterus or redness, and diplopia  HEENT: Negative for hoarseness  RESPIRATORY: Negative for cough and severe shortness of breath  CARDIOVASCULAR: Negative for crushing sub-sternal chest pain  GASTROINTESTINAL: See HPI  GENITOURINARY: Negative for dysuria  MUSCULOSKELETAL: Negative for arthralgias and myalgias  SKIN: Negative for jaundice, rash or pruritus  NEUROLOGICAL: Negative for dizziness and headaches  BEHAVIOR/PSYCH: Negative for psychotic behavior      PHYSICAL EXAM:   Blood pressure 130/77, pulse 65, height 5' 1\" (1.549 m), weight 129 lb (58.5 kg), last menstrual period 10/15/2024.    GEN: Alert, no acute distress, well-nourished   HEENT: anicteric sclera, neck supple, trachea midline, MMM, no palpable or tender neck or supraclavicular lymph nodes  CV: RRR, the extremities are warm and well perfused   LUNGS: No increased work of breathing, CTAB  ABDOMEN: Soft, symmetrical, non-tender without distention or guarding. No scars or lesions. Aorta is without bruit or visible pulsation. Umbilicus is midline without herniation. Normoactive bowel sounds are present, No masses, hepatomegaly or splenomegaly noted.  MSK: No erythema, no warmth, no swelling of joints  SKIN: No jaundice, no erythema, no rashes, no lesions  HEMATOLOGIC: No bleeding, no bruising  NEURO: Alert and interactive, LR  PSYCH: appropriate mood & affect    Labs/Imaging:     Patient's labs and imaging were reviewed and discussed with patient today.    .  ASSESSMENT/PLAN:   49 year old female with history of asthma and anemia. Presents for colon cancer screening and anemia.     #SILVINA  Continue oral iron supplement. Will repeat labs today, consider iron infusions.Has  gyne follow up at the end of the month for fibroids.    #colorectal cancer screening: patient is considered average risk for colon cancer (No immediate family hx of colon cancer) and it is appropriate to proceed with screening colonoscopy. Patient is currently asymptomatic and denies diarrhea, hematochezia, thin-stools or weight loss. We discussed risks/benefits/alternatives to procedure, including stool testing, they want to proceed with colonoscopy.  +anemia noted on blood work, last hgb 9.5        Patient Instructions   1. Schedule colonoscopy + EGD with Urgent Pool Endoscopist  Diagnosis:  anemia, colon cancer screening  Sedation: MAC  Prep: split dose golytely    2.  bowel prep from pharmacy   You can pick the bowel prep up now and store in a cool, dry place in your home until your scheduled bowel prep start date.    3. Continue all medications as normal for your procedure. DO NOT TAKE: Any form of alcohol, recreational drugs and any forms of erectile dysfunction medications 24 hours prior to procedure.    4. Read all bowel prep instructions carefully. Bowel prep instructions can also be found online at:  www.health.org/giprep     5. AVOID seeds, nuts, popcorn, raw fruits and vegetables for 5 days before procedure    6. If you start any NEW medication after your visit today, please notify us. Certain medications (like iron or weight loss medications) will need to be held before the procedure, or the procedure cannot be performed safely.             Endoscopy risk/benefit discussion: I have thoroughly discussed the risks, benefits, and alternatives of endoscopic evaluation with the patient, who demonstrated understanding. This includes the potential risks of bleeding, infection, pain, anesthesia complications, and perforation, which may result in prolonged hospitalization or surgical intervention. All of the patient’s questions were addressed to their satisfaction. The patient has chosen to proceed with  the endoscopic procedure, including any necessary interventions such as polypectomy, biopsy, control of bleeding.      Orders This Visit:  Orders Placed This Encounter   Procedures    Iron And Tibc    Ferritin    CBC W Differential W Platelet       Meds This Visit:  Requested Prescriptions     Signed Prescriptions Disp Refills    polyethylene glycol, PEG 3350-KCl-NaBcb-NaCl-NaSulf, 236 g Oral Recon Soln 1 each 0     Sig: Take 4,000 mL by mouth As Directed. Take 2,000 mL the night before your procedure and 2,000 mL the morning of your procedure.       Imaging & Referrals:  None       MICHAEL Pena    [facility]  10/17/2024               [1]   Allergies  Allergen Reactions    Shellfish ITCHING and TONGUE SWELLING

## 2024-10-17 NOTE — PATIENT INSTRUCTIONS
1. Schedule colonoscopy + EGD with Urgent Pool Endoscopist  Diagnosis:  anemia, colon cancer screening  Sedation: MAC  Prep: split dose golytely    2.  bowel prep from pharmacy   You can pick the bowel prep up now and store in a cool, dry place in your home until your scheduled bowel prep start date.    3. Continue all medications as normal for your procedure. DO NOT TAKE: Any form of alcohol, recreational drugs and any forms of erectile dysfunction medications 24 hours prior to procedure.    4. Read all bowel prep instructions carefully. Bowel prep instructions can also be found online at:  www.health.org/giprep     5. AVOID seeds, nuts, popcorn, raw fruits and vegetables for 5 days before procedure    6. If you start any NEW medication after your visit today, please notify us. Certain medications (like iron or weight loss medications) will need to be held before the procedure, or the procedure cannot be performed safely.

## 2024-10-17 NOTE — TELEPHONE ENCOUNTER
Managed care PA-    Patient is scheduled for Colonoscopy/EGD procedure within the next two weeks on 10/23/2024. Please check for PA.    Thank you!

## 2024-10-23 ENCOUNTER — HOSPITAL ENCOUNTER (OUTPATIENT)
Age: 49
Setting detail: HOSPITAL OUTPATIENT SURGERY
Discharge: HOME | End: 2024-10-23
Attending: INTERNAL MEDICINE | Admitting: INTERNAL MEDICINE
Payer: COMMERCIAL

## 2024-10-23 ENCOUNTER — ANESTHESIA (OUTPATIENT)
Dept: ENDOSCOPY | Age: 49
End: 2024-10-23
Payer: COMMERCIAL

## 2024-10-23 ENCOUNTER — ANESTHESIA EVENT (OUTPATIENT)
Dept: ENDOSCOPY | Age: 49
End: 2024-10-23
Payer: COMMERCIAL

## 2024-10-23 VITALS
DIASTOLIC BLOOD PRESSURE: 65 MMHG | OXYGEN SATURATION: 100 % | RESPIRATION RATE: 15 BRPM | HEART RATE: 68 BPM | SYSTOLIC BLOOD PRESSURE: 112 MMHG | WEIGHT: 129 LBS | HEIGHT: 60 IN | BODY MASS INDEX: 25.32 KG/M2

## 2024-10-23 DIAGNOSIS — D50.9 IRON DEFICIENCY ANEMIA, UNSPECIFIED IRON DEFICIENCY ANEMIA TYPE: ICD-10-CM

## 2024-10-23 DIAGNOSIS — Z12.11 SCREENING FOR COLON CANCER: ICD-10-CM

## 2024-10-23 LAB — B-HCG UR QL: NEGATIVE

## 2024-10-23 PROCEDURE — 43239 EGD BIOPSY SINGLE/MULTIPLE: CPT | Performed by: INTERNAL MEDICINE

## 2024-10-23 PROCEDURE — 45378 DIAGNOSTIC COLONOSCOPY: CPT | Performed by: INTERNAL MEDICINE

## 2024-10-23 PROCEDURE — 99070 SPECIAL SUPPLIES PHYS/QHP: CPT | Performed by: INTERNAL MEDICINE

## 2024-10-23 RX ORDER — SODIUM CHLORIDE, SODIUM LACTATE, POTASSIUM CHLORIDE, CALCIUM CHLORIDE 600; 310; 30; 20 MG/100ML; MG/100ML; MG/100ML; MG/100ML
INJECTION, SOLUTION INTRAVENOUS CONTINUOUS
Status: DISCONTINUED | OUTPATIENT
Start: 2024-10-23 | End: 2024-10-23

## 2024-10-23 RX ORDER — NALOXONE HYDROCHLORIDE 0.4 MG/ML
0.08 INJECTION, SOLUTION INTRAMUSCULAR; INTRAVENOUS; SUBCUTANEOUS ONCE AS NEEDED
OUTPATIENT
Start: 2024-10-23 | End: 2024-10-23

## 2024-10-23 RX ORDER — LIDOCAINE HYDROCHLORIDE 10 MG/ML
INJECTION, SOLUTION EPIDURAL; INFILTRATION; INTRACAUDAL; PERINEURAL AS NEEDED
Status: DISCONTINUED | OUTPATIENT
Start: 2024-10-23 | End: 2024-10-23 | Stop reason: SURG

## 2024-10-23 RX ORDER — SODIUM CHLORIDE, SODIUM LACTATE, POTASSIUM CHLORIDE, CALCIUM CHLORIDE 600; 310; 30; 20 MG/100ML; MG/100ML; MG/100ML; MG/100ML
INJECTION, SOLUTION INTRAVENOUS CONTINUOUS
OUTPATIENT
Start: 2024-10-23

## 2024-10-23 RX ADMIN — SODIUM CHLORIDE, SODIUM LACTATE, POTASSIUM CHLORIDE, CALCIUM CHLORIDE: 600; 310; 30; 20 INJECTION, SOLUTION INTRAVENOUS at 12:24:00

## 2024-10-23 RX ADMIN — SODIUM CHLORIDE, SODIUM LACTATE, POTASSIUM CHLORIDE, CALCIUM CHLORIDE: 600; 310; 30; 20 INJECTION, SOLUTION INTRAVENOUS at 12:12:00

## 2024-10-23 RX ADMIN — LIDOCAINE HYDROCHLORIDE 50 MG: 10 INJECTION, SOLUTION EPIDURAL; INFILTRATION; INTRACAUDAL; PERINEURAL at 12:03:00

## 2024-10-23 RX ADMIN — SODIUM CHLORIDE, SODIUM LACTATE, POTASSIUM CHLORIDE, CALCIUM CHLORIDE: 600; 310; 30; 20 INJECTION, SOLUTION INTRAVENOUS at 12:02:00

## 2024-10-23 NOTE — ANESTHESIA PREPROCEDURE EVALUATION
Anesthesia PreOp Note    HPI:     Briana Lala is a 49 year old female who presents for preoperative consultation requested by: ASHOK Cid MD    Date of Surgery: 10/23/2024    Procedure(s):  COLONOSCOPY / ESOPHAGOGASTRODUODENOSCOPY  ESOPHAGOGASTRODUODENOSCOPY (EGD)  Indication: Iron deficiency anemia, unspecified iron deficiency anemia type / Screening for colon cancer    Relevant Problems   No relevant active problems       NPO:  Last Liquid Consumption Date: 10/23/24  Last Liquid Consumption Time: 0845  Last Solid Consumption Date: 10/22/24  Last Solid Consumption Time: 0800  Last Liquid Consumption Date: 10/23/24          History Review:  Patient Active Problem List    Diagnosis Date Noted    Asthma (HCC) 2014       Past Medical History:    Asthma (HCC)    Extrinsic asthma, unspecified    since childhood       Past Surgical History:   Procedure Laterality Date      ,     Tubal ligation         Prescriptions Prior to Admission[1]  Current Medications and Prescriptions Ordered in Epic[2]    Allergies[3]    Family History   Problem Relation Age of Onset    Hypertension Mother     Hypertension Father     Pancreatic Cancer Paternal Grandmother 77    Cancer Paternal Grandmother 77        pancreatic    Breast Cancer Neg     Ovarian Cancer Neg      Social History     Socioeconomic History    Marital status: Single   Tobacco Use    Smoking status: Never     Passive exposure: Never    Smokeless tobacco: Never   Vaping Use    Vaping status: Never Used   Substance and Sexual Activity    Alcohol use: No    Drug use: No       Available pre-op labs reviewed.  Lab Results   Component Value Date    WBC 4.7 10/17/2024    RBC 4.37 10/17/2024    HGB 10.8 (L) 10/17/2024    HCT 35.0 10/17/2024    MCV 80.1 10/17/2024    MCH 24.7 (L) 10/17/2024    MCHC 30.9 (L) 10/17/2024    RDW 24.0 (H) 10/17/2024    .0 10/17/2024    URINEPREG Negative 10/23/2024     Lab Results   Component Value Date    NA  142 08/29/2024    K 3.5 08/29/2024     08/29/2024    CO2 25.0 08/29/2024    BUN 9 08/29/2024    CREATSERUM 0.68 08/29/2024    GLU 79 08/29/2024    CA 9.3 08/29/2024          Vital Signs:  Body mass index is 25.19 kg/m².   height is 1.524 m (5') and weight is 58.5 kg (129 lb). Her blood pressure is 113/60 and her pulse is 70. Her respiration is 14 and oxygen saturation is 99%.   Vitals:    10/22/24 0851 10/23/24 1129   BP:  113/60   Pulse:  70   Resp:  14   SpO2:  99%   Weight: 58.5 kg (129 lb)    Height: 1.524 m (5')         Anesthesia Evaluation      Airway   Mallampati: I  TM distance: >3 FB  Neck ROM: full  Dental          Pulmonary - normal exam   Cardiovascular - negative ROS and normal exam    Neuro/Psych - negative ROS     GI/Hepatic/Renal - negative ROS     Endo/Other - negative ROS   Abdominal                  Anesthesia Plan:   ASA:  2  Plan:   MAC  Plan Comments: I have discussed the anesthetic plan, major risks and alternatives with the patient and answered all questions. The patient desires to proceed with surgery and anesthesia as planned.     Informed Consent Plan and Risks Discussed With:  Patient      I have informed Briana Lala and/or legal guardian or family member of the nature of the anesthetic plan, benefits, risks including possible dental damage if relevant, major complications, and any alternative forms of anesthetic management.   All of the patient's questions were answered to the best of my ability. The patient desires the anesthetic management as planned.  PEG DONOHUE DO  10/23/2024 11:56 AM  Present on Admission:  **None**           [1]   Medications Prior to Admission   Medication Sig Dispense Refill Last Dose/Taking    ferrous sulfate 325 (65 FE) MG Oral Tab EC Take 1 tablet (325 mg total) by mouth daily with breakfast.   Taking    albuterol (PROAIR HFA) 108 (90 Base) MCG/ACT Inhalation Aero Soln Inhale 2 puffs into the lungs every 4 (four) hours as needed for Wheezing.  1 each 1 Taking As Needed    fluticasone propionate 110 MCG/ACT Inhalation Aerosol Inhale 2 puffs into the lungs 2 (two) times daily. (Patient taking differently: Inhale 2 puffs into the lungs as needed.) 1 each 3 Taking Differently    Multiple Vitamins-Minerals (MULTIVITAL) Oral Tab Take 1 tablet by mouth daily.   Taking    polyethylene glycol, PEG 3350-KCl-NaBcb-NaCl-NaSulf, 236 g Oral Recon Soln Take 4,000 mL by mouth As Directed. Take 2,000 mL the night before your procedure and 2,000 mL the morning of your procedure. 1 each 0     hydrocortisone 2.5 % External Cream Apply 1 Application topically 2 (two) times daily. 30 g 1     montelukast 10 MG Oral Tab Take 1 tablet (10 mg total) by mouth nightly. 90 tablet 3    [2]   Current Facility-Administered Medications Ordered in Epic   Medication Dose Route Frequency Provider Last Rate Last Admin    lactated ringers infusion   Intravenous Continuous ASHOK Cid MD         No current [facility]-ordered outpatient medications on file.   [3]   Allergies  Allergen Reactions    Shellfish ITCHING and TONGUE SWELLING

## 2024-10-23 NOTE — H&P
History & Physical Examination    Patient Name: Briana Lala  MRN: [id number]  [facility]: [id number]  YOB: 1975    Diagnosis: Screening, iron deficiency anemia    Prescriptions Prior to Admission[1]  Current Facility-Administered Medications   Medication Dose Route Frequency    lactated ringers infusion   Intravenous Continuous       Allergies: Allergies[2]    Past Medical History:    Asthma (HCC)    Extrinsic asthma, unspecified    since childhood     Past Surgical History:   Procedure Laterality Date      ,     Tubal ligation  2016     Family History   Problem Relation Age of Onset    Hypertension Mother     Hypertension Father     Pancreatic Cancer Paternal Grandmother 77    Cancer Paternal Grandmother 77        pancreatic    Breast Cancer Neg     Ovarian Cancer Neg      Social History     Tobacco Use    Smoking status: Never     Passive exposure: Never    Smokeless tobacco: Never   Substance Use Topics    Alcohol use: No         SYSTEM Check if Review is Normal Check if Physical Exam is Normal If not normal, please explain:   HEENT [X ] [ X]    NECK  [X ] [ X]    HEART [X ] [ X]    LUNGS [X ] [ X]    ABDOMEN [X ] [ X]    EXTREMITIES [X ] [ X]    OTHER        I have discussed the risks and benefits and alternatives of the procedure with the patient/family.  They understand and agree to proceed with plan of care.   I have reviewed the History and Physical done within the last 30 days.  Any changes noted above.    JOSE ANTONIO Cid MD  [facility] - Gastroenterology  10/23/2024  12:02 PM               [1]   Medications Prior to Admission   Medication Sig Dispense Refill Last Dose/Taking    ferrous sulfate 325 (65 FE) MG Oral Tab EC Take 1 tablet (325 mg total) by mouth daily with breakfast.   Taking    albuterol (PROAIR HFA) 108 (90 Base) MCG/ACT Inhalation Aero Soln Inhale 2 puffs into the lungs every 4 (four) hours as needed for Wheezing. 1 each 1 Taking As Needed    fluticasone  propionate 110 MCG/ACT Inhalation Aerosol Inhale 2 puffs into the lungs 2 (two) times daily. (Patient taking differently: Inhale 2 puffs into the lungs as needed.) 1 each 3 Taking Differently    Multiple Vitamins-Minerals (MULTIVITAL) Oral Tab Take 1 tablet by mouth daily.   Taking    polyethylene glycol, PEG 3350-KCl-NaBcb-NaCl-NaSulf, 236 g Oral Recon Soln Take 4,000 mL by mouth As Directed. Take 2,000 mL the night before your procedure and 2,000 mL the morning of your procedure. 1 each 0     hydrocortisone 2.5 % External Cream Apply 1 Application topically 2 (two) times daily. 30 g 1     montelukast 10 MG Oral Tab Take 1 tablet (10 mg total) by mouth nightly. 90 tablet 3    [2]   Allergies  Allergen Reactions    Shellfish ITCHING and TONGUE SWELLING

## 2024-10-23 NOTE — DISCHARGE INSTRUCTIONS
Home Care Instructions for Colonoscopy and/or Gastroscopy with Sedation    Diet:  - Resume your regular diet as tolerated unless otherwise instructed.  - Start with light meals to minimize bloating.  - Do not drink alcohol today.    Medication:  - If you have questions about resuming your normal medications, please contact your Primary Care Physician.    Activities:  - Take it easy today. Do not return to work today.  - Do not drive today.  - Do not operate any machinery today (including kitchen equipment).    Colonoscopy:  - You may notice some rectal \"spotting\" (a little blood on the toilet tissue) for a day or two after the exam. This is normal.  - If you experience any rectal bleeding (not spotting), persistent tenderness or sharp severe abdominal pains, oral temperature over 100 degrees Fahrenheit, light-headedness or dizziness, or any other problems, contact your doctor.    Gastroscopy:  - You may have a sore throat for 2-3 days following the exam. This is normal. Gargling with warm salt water (1/2 tsp salt to 1 glass warm water) or using throat lozenges will help.  - If you experience any sharp pain in your neck, abdomen or chest, vomiting of blood, oral temperature over 100 degrees Fahrenheit, light-headedness or dizziness, or any other problems, contact your doctor.    **If unable to reach your doctor, please go to the [facility] Emergency Room**    - Your referring physician will receive a full report of your examination.  - If you do not hear from your doctor's office within two weeks of your biopsy, please call them for your results.    You may be able to see your laboratory results in [employer] between 4 and 7 business days.  In some cases, your physician may not have viewed the results before they are released to [employer].  If you have questions regarding your results contact the physician who ordered the test/exam by phone or via [employer] by choosing \"Ask a Medical Question.\"

## 2024-10-23 NOTE — OPERATIVE REPORT
ESOPHAGOGASTRODUODENOSCOPY (EGD) & COLONOSCOPY REPORT    Briana Lala     1975 Age 49 year old   PCP Colleen Weiler, DO Endoscopist Norma Cid MD     Date of procedure: 10/23/24    Procedure: EGD w/cold biopsy & Colonoscopy     Pre-operative diagnosis: Screening, iron deficiency anemia    Post-operative diagnosis: Gastric erosions, internal hemorrhoids    Medications: MAC    Withdrawal time: 9 minutes    Complications: none    Procedure: Informed consent was obtained from the patient after the risks of the procedure were discussed, including but not limited to bleeding, perforation, aspiration, infection, or possibility of a missed lesion. We discussed the risks/benefits and alternatives to this procedure, as well as the planned sedation. EGD procedure: The patient was placed in the left lateral decubitus position and begun on continuous blood pressure pulse oximetry and EKG monitoring and this was maintained throughout the procedure. Once an adequate level of sedation was obtained a bite block was placed. Then the lubricated tip of the [id number]-189 diagnostic video upper endoscope was inserted and advanced using direct visualization into the posterior pharynx and ultimately into the esophagus. Colonoscopy procedure: Once an adequate level of sedation was obtained a digital rectal exam was completed. Then the lubricated tip of the [id number] diagnostic video colonoscope was inserted and advanced without difficulty to the cecum using the CO2 insufflation technique. The cecum was identified by localizing the trifold, the appendix and the ileocecal valve. A routine second examination of the cecum/ascending colon was performed. Withdrawal was begun with thorough washing and careful examination of the colonic walls and folds. Photodocumentation was obtained. The bowel prep was good. Views of the colon were good with washing. I then carefully withdrew the instrument from the patient who  tolerated the procedure well.     Complications: None    EGD findings:      1. Esophagus: The squamocolumnar junction was noted at 38 cm and appeared regular. The diaphragmatic pinch was noted noted at 38 cm from the incisors. The esophageal mucosa appeared normal. There was no evidence of esophagitis, stricture or endoscopic evidence of Nelson's esophagus.  2. Stomach: The stomach distended normally. Normal rugal folds were seen. The pylorus was patent. The gastric mucosa appeared normal other than some scattered erosions in the antrum. Retroflexion revealed a normal fundus and a non-patulous cardia.   3. Duodenum: The duodenal mucosa appeared normal in the 1st and 2nd portion of the duodenum. S/p random biopsies.    Colonoscopy findings:    1. No polyp(s) noted.  2. Diverticulosis: none.  3. Terminal ileum: the visualized mucosa appeared normal.  4. The colonic mucosa throughout the colon showed normal vascular pattern, without evidence of angioectasias or inflammation.   5. A retroflexed view of the rectum revealed small internal hemorrhoids.  6. AMBER: normal rectal tone, no masses palpated.     Impression:  EGD shows no mass or ulcer, mild scattered erosions in the antrum s/p biopsies.   Normal colonoscopy to the terminal ileum, other than small internal hemorrhoids.   No findings to explain iron deficiency anemia, likely 2/2 menorrhagia.    Recommend:  Await pathology. Repeat CLN in 10 years. If new signs or symptoms develop, colonoscopy may need to be repeated sooner.   High fiber diet.  Monitor for blood in the stool. If having more than just tinge of blood, call office or go to the ER.  Avoid NSAIDS.  F/u with gynecology.    >>>If tissue was obtained and you have not received your pathology results either by phone or letter within 2 weeks, please call our office at [phone].    Specimens: gastric, duodenum  Blood loss: <1 ml

## 2024-10-23 NOTE — ANESTHESIA POSTPROCEDURE EVALUATION
Patient: Briana Lala    Procedure Summary       Date: 10/23/24 Room / Location: [facility] ENDOSCOPY 01 / [facility] ENDO    Anesthesia Start: 1202 Anesthesia Stop: 1232    Procedures:       COLONOSCOPY / ESOPHAGOGASTRODUODENOSCOPY      ESOPHAGOGASTRODUODENOSCOPY (EGD) Diagnosis:       Iron deficiency anemia, unspecified iron deficiency anemia type      Screening for colon cancer      (gastric erosions,hemorrhoids)    Surgeons: ASHOK Cid MD Anesthesiologist: Babar Gillespie DO    Anesthesia Type: MAC ASA Status: 2            Anesthesia Type: MAC    Vitals Value Taken Time   /67 10/23/24 1233   Temp  10/23/24 1234   Pulse 74 10/23/24 1234   Resp 26 10/23/24 1234   SpO2 100 % 10/23/24 1234   Vitals shown include unfiled device data.    [facility] AN Post Evaluation:   Patient Evaluated in PACU  Patient Participation: complete - patient participated  Level of Consciousness: awake  Pain Management: adequate  Airway Patency:patent  Dental exam unchanged from preop  Yes    Nausea/Vomiting: none  Cardiovascular Status: acceptable  Respiratory Status: acceptable and room air  Postoperative Hydration acceptable      BABAR GILLESPIE DO  10/23/2024 12:34 PM

## 2024-10-31 ENCOUNTER — TELEPHONE (OUTPATIENT)
Dept: OBGYN CLINIC | Facility: CLINIC | Age: 49
End: 2024-10-31

## 2024-10-31 ENCOUNTER — OFFICE VISIT (OUTPATIENT)
Dept: OBGYN CLINIC | Facility: CLINIC | Age: 49
End: 2024-10-31
Payer: COMMERCIAL

## 2024-10-31 VITALS
HEIGHT: 61 IN | SYSTOLIC BLOOD PRESSURE: 140 MMHG | BODY MASS INDEX: 24.73 KG/M2 | WEIGHT: 131 LBS | DIASTOLIC BLOOD PRESSURE: 90 MMHG

## 2024-10-31 DIAGNOSIS — D25.1 INTRAMURAL AND SUBMUCOUS LEIOMYOMA OF UTERUS: ICD-10-CM

## 2024-10-31 DIAGNOSIS — N92.0 MENORRHAGIA WITH REGULAR CYCLE: ICD-10-CM

## 2024-10-31 DIAGNOSIS — Z01.818 PREOPERATIVE TESTING: ICD-10-CM

## 2024-10-31 DIAGNOSIS — N92.0 MENORRHAGIA WITH REGULAR CYCLE: Primary | ICD-10-CM

## 2024-10-31 DIAGNOSIS — D25.0 INTRAMURAL AND SUBMUCOUS LEIOMYOMA OF UTERUS: ICD-10-CM

## 2024-10-31 DIAGNOSIS — D21.9 FIBROIDS: Primary | ICD-10-CM

## 2024-10-31 DIAGNOSIS — N89.8 VAGINAL DISCHARGE: ICD-10-CM

## 2024-10-31 PROCEDURE — 3077F SYST BP >= 140 MM HG: CPT | Performed by: OBSTETRICS & GYNECOLOGY

## 2024-10-31 PROCEDURE — 3008F BODY MASS INDEX DOCD: CPT | Performed by: OBSTETRICS & GYNECOLOGY

## 2024-10-31 PROCEDURE — 99204 OFFICE O/P NEW MOD 45 MIN: CPT | Performed by: OBSTETRICS & GYNECOLOGY

## 2024-10-31 PROCEDURE — 88305 TISSUE EXAM BY PATHOLOGIST: CPT | Performed by: OBSTETRICS & GYNECOLOGY

## 2024-10-31 PROCEDURE — 58100 BIOPSY OF UTERUS LINING: CPT | Performed by: OBSTETRICS & GYNECOLOGY

## 2024-10-31 PROCEDURE — 3080F DIAST BP >= 90 MM HG: CPT | Performed by: OBSTETRICS & GYNECOLOGY

## 2024-10-31 NOTE — TELEPHONE ENCOUNTER
12/11 1200    ----- Message from Katy Chatman sent at 10/31/2024 10:55 AM CDT -----  Regarding: pls sched surg  Please schedule the following surgery:    Procedure: TLH with cystoscopy, possible laparotomy  Assist: Sue  Date: 12/11/24                               Time Requested: to follow my other case  Dx: fibroids, menorrhagia with regular cycle  Pre-op appt: na  Admission type: outpatient  Department of discharge(SDS/Floor): sds  Expected length of stay: na  Procedure length time (please enter amount you are requesting): 1-5 hours  Recovery time (patients always ask): 4-6 weeks  Medical Clearance: (Y/N) n  Post- Op f/u appt time frame: 2 weeks and 6 weeks post-op    Pre-op orders (choose one)   x Use [facility] procedure driven order set in addition to anesthesia protocol   Use [facility] surgeon's personalized order set   Surgeon to enter pre-op orders   No pre-op orders  x Use the prophylactic antibiotic protocol   No pre-op antibiotic orders indicated do not give antibiotic, if any, listed on the procedure driven order sets or personalized order sets    PCN allergy Yes___ or No_x__    If Yes: Proceed with PCN/Cephalosporin recommended antibiotic as benefit outweighs risk  Proceed with PCN/Cephalosporin recommended antibiotic as not a true allergy    Proceed with recommended alternative antibiotic for PCN allergy        ALL Medicaid/including BCBS community: Tubal/ Hyst form MUST be signed (30 days):      Message to nurses:   Thank you!  ~RD

## 2024-11-01 RX ORDER — TRANEXAMIC ACID 650 MG/1
1300 TABLET ORAL 3 TIMES DAILY
Qty: 30 TABLET | Refills: 1 | Status: SHIPPED | OUTPATIENT
Start: 2024-11-01 | End: 2024-11-06

## 2024-11-07 ENCOUNTER — TELEPHONE (OUTPATIENT)
Dept: OBGYN CLINIC | Facility: CLINIC | Age: 49
End: 2024-11-07

## 2024-11-07 DIAGNOSIS — B96.89 BACTERIAL VAGINOSIS: Primary | ICD-10-CM

## 2024-11-07 DIAGNOSIS — N89.8 VAGINAL ITCHING: ICD-10-CM

## 2024-11-07 DIAGNOSIS — N76.0 BACTERIAL VAGINOSIS: Primary | ICD-10-CM

## 2024-11-07 RX ORDER — FLUCONAZOLE 150 MG/1
TABLET ORAL
Qty: 2 TABLET | Refills: 0 | Status: SHIPPED | OUTPATIENT
Start: 2024-11-07

## 2024-11-07 RX ORDER — METRONIDAZOLE 500 MG/1
500 TABLET ORAL 2 TIMES DAILY
Qty: 14 TABLET | Refills: 0 | Status: SHIPPED | OUTPATIENT
Start: 2024-11-07

## 2024-11-07 NOTE — TELEPHONE ENCOUNTER
Pt contacted,  and name verified. Pt provided with VIKOR result. RN told pt that abx would be sent to her preferred pharmacy (verified). RN provided admin instructions. Pt verbalized understanding and agreed with POC.        +VIKOR:  Uncultured Megasphera 1: 43.476%  Gardnerella vaginalis: 43.476

## 2024-11-13 ENCOUNTER — TELEPHONE (OUTPATIENT)
Dept: GASTROENTEROLOGY | Facility: CLINIC | Age: 49
End: 2024-11-13

## 2024-11-13 NOTE — TELEPHONE ENCOUNTER
I mailed out colonoscopy/EGD results letter to patient.  Updated health maintenance  Entered into  10 yr CLN recall  Recall colon in 10 years per. Colon done 10/23/2024      ASHOK Cid MD  [facility] Gi Clinical Staff  GI staff: please place recall in for colonoscopy in 10 years

## 2024-12-03 NOTE — TELEPHONE ENCOUNTER
Refill passed per [facility] protocol.      Please see patients MyChart Message  pended as needed for review     Briana Brown Rn Triage (supporting Angélica Bailey RN)53 minutes ago (3:14 PM)       Hello, As needed not sure why they requested, but it’s as needed  Requested Prescriptions   Pending Prescriptions Disp Refills    MONTELUKAST 10 MG Oral Tab [Pharmacy Med Name: MONTELUKAST 10MG TABLETS] 90 tablet 3     Sig: TAKE 1 TABLET(10 MG) BY MOUTH EVERY NIGHT       Asthma & COPD Medication Protocol Passed - 12/3/2024  4:09 PM        Passed - ACT Score greater than or equal to 20     24 (8/29/2024  9:38 AM)            Passed - Appointment in past 6 or next 3 months      Recent Outpatient Visits              1 month ago Menorrhagia with regular cycle    [facility] - OB/GYN Katy Chatman, DO    Office Visit    1 month ago Screening for colon cancer    [facility] [place] [facility]    Office Visit    2 months ago     [facility]    Nurse Only    3 months ago Iron deficiency anemia due to chronic blood loss    [facility] Weiler, Colleen M, DO    Office Visit    3 months ago Routine physical examination    [facility] [place] We[place], Colleen M, DO    Office Visit                      Passed - ACT recorded in the last 12 months     24 (8/29/2024  9:38 AM)               Recent Outpatient Visits              1 month ago Menorrhagia with regular cycle    [facility] - OB/GYN Katy Chatman, DO    Office Visit    1 month ago [facility]    [facility] Visit    2 months ago     [facility]    Nurse Only    3 months ago Iron deficiency  anemia due to chronic blood loss    [facility] [address] [place] Weiler, Colleen M, DO    Office Visit    3 months ago Routine physical examination    [facility] [address], [place] Weiler, Colleen M, DO    Office Visit

## 2024-12-03 NOTE — TELEPHONE ENCOUNTER
Refill passed per [facility] ([place], [place]) protocol.    Preen.Me message sent to clarify if taking daily or PRN     Requested Prescriptions   Pending Prescriptions Disp Refills    MONTELUKAST 10 MG Oral Tab [Pharmacy Med Name: MONTELUKAST 10MG TABLETS] 90 tablet 3     Sig: TAKE 1 TABLET(10 MG) BY MOUTH EVERY NIGHT       Asthma & COPD Medication Protocol Passed - 12/3/2024  3:10 PM        Passed - ACT Score greater than or equal to 20     24 (8/29/2024  9:38 AM)            Passed - Appointment in past 6 or next 3 months      Recent Outpatient Visits              1 month ago Menorrhagia with regular cycle    [facility] ([place], [place] - OB/GYN Katy Chatman, DO    Office Visit    1 month ago [facility]    [facility]    [facility]    3 months ago Iron deficiency anemia due to chronic blood loss    [facility] [place], Colleen M, DO    Office Visit    3 months ago Routine physical examination    [facility] [place] Weiler, Colleen M, DO    Office Visit                      Passed - ACT recorded in the last 12 months     24 (8/29/2024  9:38 AM)                 Recent Outpatient Visits              1 month ago Menorrhagia with regular cycle    [facility] ([place], [place] - OB/GYN Katy Chatman, DO    Office Visit    1 month ago [facility] for colon cancer    [facility]    Office Visit    2 months [facility]    [facility]    3 months ago Iron deficiency anemia due to chronic blood loss    [facility] [place], Colleen M, DO    Office Visit    3 months ago Routine physical  examination    [facility] Weiler, Colleen M, DO    Office Visit

## 2024-12-04 RX ORDER — MONTELUKAST SODIUM 10 MG/1
10 TABLET ORAL NIGHTLY PRN
Qty: 90 TABLET | Refills: 1 | Status: SHIPPED | OUTPATIENT
Start: 2024-12-04

## 2024-12-04 NOTE — TELEPHONE ENCOUNTER
This has been routed to Dr. Weiler already, we are waiting for response.   Patient per below uses this PRN

## 2024-12-10 NOTE — DISCHARGE INSTRUCTIONS
Post Operative Home Care Instructions     We hope you were pleased with your care at [facility].  We wish you the best outcome and overall experience.  These instructions will help to minimize pain, limit the risk for an infection, and improve the likelihood of a successful recovery.    What to Expect:  Abdominal cramping   Vaginal bleeding for about 4-6 weeks   Constipation is common after surgery. Please keep up with Colace twice per day and Miralax as needed.    Prescribed medication:  - prescriptions for gabapentin and ibuprofen were sent to your preferred pharmacy.   - take the gabapentin every 8 hours for the next 3-4 days, then you can back off and take only as needed for the rest of the week.  - take the ibuprofen every 8 hours for the next 3-4 days (alternate with the gabapentin), then you can back off and take only as needed for pain while you continue healing.      Over-The-Counter Medication  Non-prescription anti-inflammatory medications can also help to ease the pain.  You may take Tylenol in addition to the prescribed medications.  Colace or Metamucil for Constipation  Drink a full glass of water with oral medication and take as directed.    Wound Care  The following instructions will promote proper healing and help to prevent infection  Please use soap and water over incision   Pat your incision dry and leave open to air if possible   If you have steri - strips, then please remove after 4-5 days from your surgery. You may remove after a shower to decrease discomfort.   Do not replace the Steri-Strips, if they come off.  If the tapes come off, leave them off and keep the incision clean.  You do not need to cover the incision or put any medications on the incision.  If you have staples make sure to schedule your follow up appointment for staple removal.     Bathing/Showers  You may resume showers  No baths, swimming, hot tubs for at least 8 weeks.     Home Medication  Resume your home  medications as instructed    Diet   Resume your normal diet    Activity  Refrain from vaginal intercourse, vaginal suppositories, tampon use or douches until after your follow up visit   No exercising for 4 weeks  You may climb stairs minimally for the 1st week.    Do not do heavy housework for at least 2-3 weeks    Return to Work or School  You may return to work in 4-6 weeks  Contact your gynecologist's office, if you need a medical release. ([phone])    Driving  Avoid driving for 1-2 weeks or sooner if not taking narcotics. No driving while taking narcotics.     Follow-up Appointment with Your Obstetrician  Call your Gynecologist's office today for an incision check appointment and/or follow up appointment.   The number is [phone].  Verify your appointment date, day, time, and location.  At your office visit:  Your progress will be evaluated, pathology will be reviewed, and any additional concerns and instructions will be discussed.    Questions or Concerns  Call your gynecologist's office if you experience the following:  Severe pain not controlled by pain medication  Foul smelling vaginal discharge  Heavy bleeding  Shortness of breath  Fever  Redness, increased swelling or drainage from your incision  Crying and periods of sadness that prevents you from caring for yourself   Burning sensation during urination or inability to urinate  Swelling, redness or abnormal warmth to your leg/calf  Please call [phone]. If your call is made after office hours, a physician will be available to help you.  There is always a provider covering our patients.    Thank you for coming to [facility] for your surgery.  The nurses, gynecologist, and the anesthesiologists try very hard to make sure you receive the best care possible.  Your trust in them as well as us is greatly appreciated.    Thanks so much,   The Providers of [facility]ne[facility]        HOME [facility]  INSTRUCTIONS: POST-OP ANESTHESIA  The medication that you received for sedation or general anesthesia can last up to 24 hours. Your judgment and reflexes may be altered, even if you feel like your normal self.      We Recommend:   Do not drive any motor vehicle or bicycle   Avoid mowing the lawn, playing sports, or working with power tools/applicances (power saws, electric knives or mixers)   That you have someone stay with you on your first night home   Do not drink alcohol or take sleeping pills or tranquilizers   Do not sign legal documents within 24 hours of your procedure   If you had a nerve block for your surgery, take extra care not to put any pressure on your arm or hand for 24 hours    It is normal:  For you to have a sore throat if you had a breathing tube during surgery (while you were asleep!). The sore throat should get better within 48 hours. You can gargle with warm salt water (1/2 tsp in 4 oz warm water) or use a throat lozenge for comfort  To feel muscle aches or soreness especially in the abdomen, chest or neck. The achy feeling should go away in the next 24 hours  To feel weak, sleepy or \"wiped out\". Your should start feeling better in the next 24 hours.   To experience mild discomforts such as sore lip or tongue, headache, cramps, gas pains or a bloated feeling in your abdomen.   To experience mild back pain or soreness for a day or two if you had spinal or epidural anesthesia.   If you had laparoscopic surgery, to feel shoulder pain or discomfort on the day of surgery.   For some patients to have nausea after surgery/anesthesia    If you feel nausea or experience vomiting:   Try to move around less.   Eat less than usual or drink only liquids until the next morning   Nausea should resolve in about 24 hours    If you have a problem when you are at home:    Call your surgeons office   Discharge Instructions: After Your Surgery  You’ve just had surgery. During surgery, you were given medicine  called anesthesia to keep you relaxed and free of pain. After surgery, you may have some pain or nausea. This is common. Here are some tips for feeling better and getting well after surgery.   Going home  Your healthcare provider will show you how to take care of yourself when you go home. They'll also answer your questions. Have an adult family member or friend drive you home. For the first 24 hours after your surgery:   Don't drive or use heavy equipment.  Don't make important decisions or sign legal papers.  Take medicines as directed.  Don't drink alcohol.  Have someone stay with you, if needed. They can watch for problems and help keep you safe.  Be sure to go to all follow-up visits with your healthcare provider. And rest after your surgery for as long as your provider tells you to.   Coping with pain  If you have pain after surgery, pain medicine will help you feel better. Take it as directed, before pain becomes severe. Also, ask your healthcare provider or pharmacist about other ways to control pain. This might be with heat, ice, or relaxation. And follow any other instructions your surgeon or nurse gives you.      Stay on schedule with your medicine.     Tips for taking pain medicine  To get the best relief possible, remember these points:   Pain medicines can upset your stomach. Taking them with a little food may help.  Most pain relievers taken by mouth need at least 20 to 30 minutes to start to work.  Don't wait till your pain becomes severe before you take your medicine. Try to time your medicine so that you can take it before starting an activity. This might be before you get dressed, go for a walk, or sit down for dinner.  Constipation is a common side effect of some pain medicines. Call your healthcare provider before taking any medicines such as laxatives or stool softeners to help ease constipation. Also ask if you should skip any foods. Drinking lots of fluids and eating foods such as fruits and  vegetables that are high in fiber can also help. Remember, don't take laxatives unless your surgeon has prescribed them.  Drinking alcohol and taking pain medicine can cause dizziness and slow your breathing. It can even be deadly. Don't drink alcohol while taking pain medicine.  Pain medicine can make you react more slowly to things. Don't drive or run machinery while taking pain medicine.  Your healthcare provider may tell you to take acetaminophen to help ease your pain. Ask them how much you're supposed to take each day. Acetaminophen or other pain relievers may interact with your prescription medicines or other over-the-counter (OTC) medicines. Some prescription medicines have acetaminophen and other ingredients in them. Using both prescription and OTC acetaminophen for pain can cause you to accidentally overdose. Read the labels on your OTC medicines with care. This will help you to clearly know the list of ingredients, how much to take, and any warnings. It may also help you not take too much acetaminophen. If you have questions or don't understand the information, ask your pharmacist or healthcare provider to explain it to you before you take the OTC medicine.   Managing nausea  Some people have an upset stomach (nausea) after surgery. This is often because of anesthesia, pain, or pain medicine, less movement of food in the stomach, or the stress of surgery. These tips will help you handle nausea and eat healthy foods as you get better. If you were on a special food plan before surgery, ask your healthcare provider if you should follow it while you get better. Check with your provider on how your eating should progress. It may depend on the surgery you had. These general tips may help:   Don't push yourself to eat. Your body will tell you when to eat and how much.  Start off with clear liquids and soup. They're easier to digest.  Next try semi-solid foods as you feel ready. These include mashed potatoes,  applesauce, and gelatin.  Slowly move to solid foods. Don’t eat fatty, rich, or spicy foods at first.  Don't force yourself to have 3 large meals a day. Instead eat smaller amounts more often.  Take pain medicines with a small amount of solid food, such as crackers or toast. This helps prevent nausea.  When to call your healthcare provider  Call your healthcare provider right away if you have any of these:   You still have too much pain, or the pain gets worse, after taking the medicine. The medicine may not be strong enough. Or there may be a complication from the surgery.  You feel too sleepy, dizzy, or groggy. The medicine may be too strong.  Side effects such as nausea or vomiting. Your healthcare provider may advise taking other medicines to .  Skin changes such as rash, itching, or hives. This may mean you have an allergic reaction. Your provider may advise taking other medicines.  The incision looks different (for instance, part of it opens up).  Bleeding or fluid leaking from the incision site, and weren't told to expect that.  Fever of 100.4°F (38°C) or higher, or as directed by your provider.  Call 911  Call 911 right away if you have:   Trouble breathing  Facial swelling    If you have obstructive sleep apnea   You were given anesthesia medicine during surgery to keep you comfortable and free of pain. After surgery, you may have more apnea spells because of this medicine and other medicines you were given. The spells may last longer than normal.    At home:  Keep using the continuous positive airway pressure (CPAP) device when you sleep. Unless your healthcare provider tells you not to, use it when you sleep, day or night. CPAP is a common device used to treat obstructive sleep apnea.  Talk with your provider before taking any pain medicine, muscle relaxants, or sedatives. Your provider will tell you about the possible dangers of taking these medicines.  Contact your provider if your sleeping changes a  lot even when taking medicines as directed.  [employer] last reviewed this educational content on 10/1/2021  © 3465-7894 [employer]. All rights reserved. This information is not intended as a substitute for professional medical care. Always follow your healthcare professional's instructions.

## 2024-12-11 ENCOUNTER — ANESTHESIA (OUTPATIENT)
Dept: SURGERY | Facility: HOSPITAL | Age: 49
End: 2024-12-11
Payer: COMMERCIAL

## 2024-12-11 ENCOUNTER — HOSPITAL ENCOUNTER (OUTPATIENT)
Facility: HOSPITAL | Age: 49
Setting detail: HOSPITAL OUTPATIENT SURGERY
Discharge: HOME | End: 2024-12-11
Attending: OBSTETRICS & GYNECOLOGY | Admitting: OBSTETRICS & GYNECOLOGY
Payer: COMMERCIAL

## 2024-12-11 ENCOUNTER — TELEPHONE (OUTPATIENT)
Dept: OBGYN CLINIC | Facility: CLINIC | Age: 49
End: 2024-12-11

## 2024-12-11 ENCOUNTER — ANESTHESIA EVENT (OUTPATIENT)
Dept: SURGERY | Facility: HOSPITAL | Age: 49
End: 2024-12-11
Payer: COMMERCIAL

## 2024-12-11 VITALS
WEIGHT: 130 LBS | SYSTOLIC BLOOD PRESSURE: 121 MMHG | TEMPERATURE: 98 F | HEIGHT: 60 IN | RESPIRATION RATE: 12 BRPM | DIASTOLIC BLOOD PRESSURE: 57 MMHG | OXYGEN SATURATION: 98 % | HEART RATE: 73 BPM | BODY MASS INDEX: 25.52 KG/M2

## 2024-12-11 DIAGNOSIS — N92.0 MENORRHAGIA WITH REGULAR CYCLE: ICD-10-CM

## 2024-12-11 DIAGNOSIS — D21.9 FIBROIDS: ICD-10-CM

## 2024-12-11 PROBLEM — N92.4 EXCESSIVE BLEEDING IN PREMENOPAUSAL PERIOD: Status: ACTIVE | Noted: 2024-12-11

## 2024-12-11 LAB
ANTIBODY SCREEN: NEGATIVE
B-HCG UR QL: NEGATIVE
BASOPHILS # BLD AUTO: 0.03 X10(3) UL (ref 0–0.2)
BASOPHILS NFR BLD AUTO: 0.7 %
DEPRECATED RDW RBC AUTO: 47.5 FL (ref 35.1–46.3)
EOSINOPHIL # BLD AUTO: 0.07 X10(3) UL (ref 0–0.7)
EOSINOPHIL NFR BLD AUTO: 1.7 %
ERYTHROCYTE [DISTWIDTH] IN BLOOD BY AUTOMATED COUNT: 15.8 % (ref 11–15)
HCT VFR BLD AUTO: 35.6 %
HGB BLD-MCNC: 11.1 G/DL
IMM GRANULOCYTES # BLD AUTO: 0.02 X10(3) UL (ref 0–1)
IMM GRANULOCYTES NFR BLD: 0.5 %
LYMPHOCYTES # BLD AUTO: 1.31 X10(3) UL (ref 1–4)
LYMPHOCYTES NFR BLD AUTO: 32 %
MCH RBC QN AUTO: 26.2 PG (ref 26–34)
MCHC RBC AUTO-ENTMCNC: 31.2 G/DL (ref 31–37)
MCV RBC AUTO: 84.2 FL
MONOCYTES # BLD AUTO: 0.31 X10(3) UL (ref 0.1–1)
MONOCYTES NFR BLD AUTO: 7.6 %
NEUTROPHILS # BLD AUTO: 2.35 X10 (3) UL (ref 1.5–7.7)
NEUTROPHILS # BLD AUTO: 2.35 X10(3) UL (ref 1.5–7.7)
NEUTROPHILS NFR BLD AUTO: 57.5 %
PLATELET # BLD AUTO: 191 10(3)UL (ref 150–450)
PLATELETS.RETICULATED NFR BLD AUTO: 9.7 % (ref 0–7)
RBC # BLD AUTO: 4.23 X10(6)UL
RH BLOOD TYPE: POSITIVE
RH BLOOD TYPE: POSITIVE
WBC # BLD AUTO: 4.1 X10(3) UL (ref 4–11)

## 2024-12-11 PROCEDURE — 86850 RBC ANTIBODY SCREEN: CPT | Performed by: OBSTETRICS & GYNECOLOGY

## 2024-12-11 PROCEDURE — 85025 COMPLETE CBC W/AUTO DIFF WBC: CPT | Performed by: OBSTETRICS & GYNECOLOGY

## 2024-12-11 PROCEDURE — 0UT74ZZ RESECTION OF BILATERAL FALLOPIAN TUBES, PERCUTANEOUS ENDOSCOPIC APPROACH: ICD-10-PCS | Performed by: OBSTETRICS & GYNECOLOGY

## 2024-12-11 PROCEDURE — 86901 BLOOD TYPING SEROLOGIC RH(D): CPT | Performed by: OBSTETRICS & GYNECOLOGY

## 2024-12-11 PROCEDURE — 81025 URINE PREGNANCY TEST: CPT

## 2024-12-11 PROCEDURE — 86900 BLOOD TYPING SEROLOGIC ABO: CPT | Performed by: OBSTETRICS & GYNECOLOGY

## 2024-12-11 PROCEDURE — 0UT94ZZ RESECTION OF UTERUS, PERCUTANEOUS ENDOSCOPIC APPROACH: ICD-10-PCS | Performed by: OBSTETRICS & GYNECOLOGY

## 2024-12-11 PROCEDURE — 88307 TISSUE EXAM BY PATHOLOGIST: CPT | Performed by: OBSTETRICS & GYNECOLOGY

## 2024-12-11 RX ORDER — MORPHINE SULFATE 4 MG/ML
4 INJECTION, SOLUTION INTRAMUSCULAR; INTRAVENOUS EVERY 10 MIN PRN
Status: DISCONTINUED | OUTPATIENT
Start: 2024-12-11 | End: 2024-12-11

## 2024-12-11 RX ORDER — MIDAZOLAM HYDROCHLORIDE 1 MG/ML
INJECTION INTRAMUSCULAR; INTRAVENOUS AS NEEDED
Status: DISCONTINUED | OUTPATIENT
Start: 2024-12-11 | End: 2024-12-11 | Stop reason: SURG

## 2024-12-11 RX ORDER — BUPIVACAINE HYDROCHLORIDE 2.5 MG/ML
INJECTION, SOLUTION EPIDURAL; INFILTRATION; INTRACAUDAL AS NEEDED
Status: DISCONTINUED | OUTPATIENT
Start: 2024-12-11 | End: 2024-12-11 | Stop reason: HOSPADM

## 2024-12-11 RX ORDER — ONDANSETRON 4 MG/1
4 TABLET, FILM COATED ORAL EVERY 8 HOURS PRN
Status: DISCONTINUED | OUTPATIENT
Start: 2024-12-11 | End: 2024-12-11

## 2024-12-11 RX ORDER — HYDROMORPHONE HYDROCHLORIDE 1 MG/ML
0.4 INJECTION, SOLUTION INTRAMUSCULAR; INTRAVENOUS; SUBCUTANEOUS EVERY 5 MIN PRN
Status: DISCONTINUED | OUTPATIENT
Start: 2024-12-11 | End: 2024-12-11

## 2024-12-11 RX ORDER — SODIUM CHLORIDE, SODIUM LACTATE, POTASSIUM CHLORIDE, CALCIUM CHLORIDE 600; 310; 30; 20 MG/100ML; MG/100ML; MG/100ML; MG/100ML
INJECTION, SOLUTION INTRAVENOUS CONTINUOUS
Status: DISCONTINUED | OUTPATIENT
Start: 2024-12-11 | End: 2024-12-11

## 2024-12-11 RX ORDER — ONDANSETRON 2 MG/ML
4 INJECTION INTRAMUSCULAR; INTRAVENOUS EVERY 8 HOURS PRN
Status: DISCONTINUED | OUTPATIENT
Start: 2024-12-11 | End: 2024-12-11

## 2024-12-11 RX ORDER — GABAPENTIN 300 MG/1
300 CAPSULE ORAL ONCE AS NEEDED
Status: COMPLETED | OUTPATIENT
Start: 2024-12-11 | End: 2024-12-11

## 2024-12-11 RX ORDER — PHENYLEPHRINE HCL 10 MG/ML
VIAL (ML) INJECTION AS NEEDED
Status: DISCONTINUED | OUTPATIENT
Start: 2024-12-11 | End: 2024-12-11 | Stop reason: SURG

## 2024-12-11 RX ORDER — LIDOCAINE HYDROCHLORIDE 10 MG/ML
INJECTION, SOLUTION EPIDURAL; INFILTRATION; INTRACAUDAL; PERINEURAL AS NEEDED
Status: DISCONTINUED | OUTPATIENT
Start: 2024-12-11 | End: 2024-12-11 | Stop reason: SURG

## 2024-12-11 RX ORDER — ONDANSETRON 2 MG/ML
INJECTION INTRAMUSCULAR; INTRAVENOUS AS NEEDED
Status: DISCONTINUED | OUTPATIENT
Start: 2024-12-11 | End: 2024-12-11 | Stop reason: SURG

## 2024-12-11 RX ORDER — METOCLOPRAMIDE HYDROCHLORIDE 5 MG/ML
INJECTION INTRAMUSCULAR; INTRAVENOUS AS NEEDED
Status: DISCONTINUED | OUTPATIENT
Start: 2024-12-11 | End: 2024-12-11 | Stop reason: SURG

## 2024-12-11 RX ORDER — NALOXONE HYDROCHLORIDE 0.4 MG/ML
0.08 INJECTION, SOLUTION INTRAMUSCULAR; INTRAVENOUS; SUBCUTANEOUS AS NEEDED
Status: DISCONTINUED | OUTPATIENT
Start: 2024-12-11 | End: 2024-12-11

## 2024-12-11 RX ORDER — IBUPROFEN 600 MG/1
600 TABLET, FILM COATED ORAL EVERY 8 HOURS PRN
Qty: 60 TABLET | Refills: 1 | Status: SHIPPED | OUTPATIENT
Start: 2024-12-11

## 2024-12-11 RX ORDER — GABAPENTIN 300 MG/1
300 CAPSULE ORAL EVERY 8 HOURS PRN
Qty: 30 CAPSULE | Refills: 0 | Status: SHIPPED | OUTPATIENT
Start: 2024-12-11

## 2024-12-11 RX ORDER — MORPHINE SULFATE 4 MG/ML
2 INJECTION, SOLUTION INTRAMUSCULAR; INTRAVENOUS EVERY 10 MIN PRN
Status: DISCONTINUED | OUTPATIENT
Start: 2024-12-11 | End: 2024-12-11

## 2024-12-11 RX ORDER — KETOROLAC TROMETHAMINE 30 MG/ML
INJECTION, SOLUTION INTRAMUSCULAR; INTRAVENOUS AS NEEDED
Status: DISCONTINUED | OUTPATIENT
Start: 2024-12-11 | End: 2024-12-11 | Stop reason: SURG

## 2024-12-11 RX ORDER — ACETAMINOPHEN 500 MG
1000 TABLET ORAL ONCE
Status: COMPLETED | OUTPATIENT
Start: 2024-12-11 | End: 2024-12-11

## 2024-12-11 RX ORDER — DEXAMETHASONE SODIUM PHOSPHATE 4 MG/ML
VIAL (ML) INJECTION AS NEEDED
Status: DISCONTINUED | OUTPATIENT
Start: 2024-12-11 | End: 2024-12-11 | Stop reason: SURG

## 2024-12-11 RX ORDER — HYDROMORPHONE HYDROCHLORIDE 1 MG/ML
0.6 INJECTION, SOLUTION INTRAMUSCULAR; INTRAVENOUS; SUBCUTANEOUS EVERY 5 MIN PRN
Status: DISCONTINUED | OUTPATIENT
Start: 2024-12-11 | End: 2024-12-11

## 2024-12-11 RX ORDER — HYDROMORPHONE HYDROCHLORIDE 1 MG/ML
0.2 INJECTION, SOLUTION INTRAMUSCULAR; INTRAVENOUS; SUBCUTANEOUS EVERY 5 MIN PRN
Status: DISCONTINUED | OUTPATIENT
Start: 2024-12-11 | End: 2024-12-11

## 2024-12-11 RX ORDER — EPHEDRINE SULFATE 50 MG/ML
INJECTION, SOLUTION INTRAVENOUS AS NEEDED
Status: DISCONTINUED | OUTPATIENT
Start: 2024-12-11 | End: 2024-12-11 | Stop reason: SURG

## 2024-12-11 RX ORDER — LABETALOL HYDROCHLORIDE 5 MG/ML
5 INJECTION, SOLUTION INTRAVENOUS EVERY 5 MIN PRN
Status: DISCONTINUED | OUTPATIENT
Start: 2024-12-11 | End: 2024-12-11

## 2024-12-11 RX ORDER — ROCURONIUM BROMIDE 10 MG/ML
INJECTION, SOLUTION INTRAVENOUS AS NEEDED
Status: DISCONTINUED | OUTPATIENT
Start: 2024-12-11 | End: 2024-12-11 | Stop reason: SURG

## 2024-12-11 RX ORDER — MORPHINE SULFATE 10 MG/ML
6 INJECTION, SOLUTION INTRAMUSCULAR; INTRAVENOUS EVERY 10 MIN PRN
Status: DISCONTINUED | OUTPATIENT
Start: 2024-12-11 | End: 2024-12-11

## 2024-12-11 RX ADMIN — ROCURONIUM BROMIDE 20 MG: 10 INJECTION, SOLUTION INTRAVENOUS at 13:26:00

## 2024-12-11 RX ADMIN — PHENYLEPHRINE HCL 100 MCG: 10 MG/ML VIAL (ML) INJECTION at 13:43:00

## 2024-12-11 RX ADMIN — DEXAMETHASONE SODIUM PHOSPHATE 8 MG: 4 MG/ML VIAL (ML) INJECTION at 12:14:00

## 2024-12-11 RX ADMIN — PHENYLEPHRINE HCL 100 MCG: 10 MG/ML VIAL (ML) INJECTION at 12:46:00

## 2024-12-11 RX ADMIN — EPHEDRINE SULFATE 10 MG: 50 INJECTION, SOLUTION INTRAVENOUS at 13:08:00

## 2024-12-11 RX ADMIN — MIDAZOLAM HYDROCHLORIDE 2 MG: 1 INJECTION INTRAMUSCULAR; INTRAVENOUS at 12:07:00

## 2024-12-11 RX ADMIN — ONDANSETRON 4 MG: 2 INJECTION INTRAMUSCULAR; INTRAVENOUS at 14:21:00

## 2024-12-11 RX ADMIN — ROCURONIUM BROMIDE 20 MG: 10 INJECTION, SOLUTION INTRAVENOUS at 12:47:00

## 2024-12-11 RX ADMIN — KETOROLAC TROMETHAMINE 30 MG: 30 INJECTION, SOLUTION INTRAMUSCULAR; INTRAVENOUS at 14:21:00

## 2024-12-11 RX ADMIN — SODIUM CHLORIDE, SODIUM LACTATE, POTASSIUM CHLORIDE, CALCIUM CHLORIDE: 600; 310; 30; 20 INJECTION, SOLUTION INTRAVENOUS at 12:06:00

## 2024-12-11 RX ADMIN — SODIUM CHLORIDE, SODIUM LACTATE, POTASSIUM CHLORIDE, CALCIUM CHLORIDE: 600; 310; 30; 20 INJECTION, SOLUTION INTRAVENOUS at 13:22:00

## 2024-12-11 RX ADMIN — ROCURONIUM BROMIDE 10 MG: 10 INJECTION, SOLUTION INTRAVENOUS at 13:04:00

## 2024-12-11 RX ADMIN — ROCURONIUM BROMIDE 30 MG: 10 INJECTION, SOLUTION INTRAVENOUS at 12:14:00

## 2024-12-11 RX ADMIN — PHENYLEPHRINE HCL 100 MCG: 10 MG/ML VIAL (ML) INJECTION at 13:21:00

## 2024-12-11 RX ADMIN — PHENYLEPHRINE HCL 100 MCG: 10 MG/ML VIAL (ML) INJECTION at 12:27:00

## 2024-12-11 RX ADMIN — METOCLOPRAMIDE HYDROCHLORIDE 10 MG: 5 INJECTION INTRAMUSCULAR; INTRAVENOUS at 12:30:00

## 2024-12-11 RX ADMIN — LIDOCAINE HYDROCHLORIDE 50 MG: 10 INJECTION, SOLUTION EPIDURAL; INFILTRATION; INTRACAUDAL; PERINEURAL at 12:10:00

## 2024-12-11 NOTE — ANESTHESIA PROCEDURE NOTES
Airway  Date/Time: 12/11/2024 12:13 PM  Urgency: Elective      General Information and Staff    Patient location during procedure: OR  Anesthesiologist: Kg Edouard MD  Performed: anesthesiologist   Performed by: Kg Edouard MD  Authorized by: Kg Edouard MD      Indications and Patient Condition  Indications for airway management: anesthesia  Spontaneous Ventilation: absent  Sedation level: deep  Preoxygenated: yes  Patient position: sniffing  Mask difficulty assessment: 0 - not attempted    Final Airway Details  Final airway type: endotracheal airway      Successful airway: ETT  Cuffed: yes   Successful intubation technique: direct laryngoscopy  Facilitating devices/methods: intubating stylet  Endotracheal tube insertion site: oral  Blade: GlideScope  Blade size: #3  ETT size (mm): 7.0    Cormack-Lehane Classification: grade I - full view of glottis  Placement verified by: capnometry   Cuff volume (mL): 8  Measured from: lips  ETT to lips (cm): 22  Number of attempts at approach: 1

## 2024-12-11 NOTE — ANESTHESIA POSTPROCEDURE EVALUATION
Patient: Briana Lala    Procedure Summary       Date: 12/11/24 Room / Location: [facility] MAIN OR 01 / [facility] MAIN OR    Anesthesia Start: 1206 Anesthesia Stop: 1434    Procedures:       Total laparoscopic hysterectomy with cystoscopy (Pelvis)      CYSTOSCOPY (Vagina ) Diagnosis:       Fibroids      Menorrhagia with regular cycle      (Fibroids [D21.9]Menorrhagia with regular cycle [N92.0])    Surgeons: Katy Chatman DO Anesthesiologist: Kg Edouard MD    Anesthesia Type: general ASA Status: 2            Anesthesia Type: general    Vitals Value Taken Time   /54 12/11/24 1503   Temp 97.8 °F (36.6 °C) 12/11/24 1433   Pulse 62 12/11/24 1505   Resp 14 12/11/24 1505   SpO2 100 % 12/11/24 1505   Vitals shown include unfiled device data.    [facility] AN Post Evaluation:   Patient Evaluated in PACU  Patient Participation: complete - patient participated  Level of Consciousness: sleepy but conscious and awake  Pain Score: 0  Pain Management: adequate  Airway Patency:patent  Dental exam unchanged from preop  Yes    Nausea/Vomiting: none  Cardiovascular Status: acceptable and hemodynamically stable  Respiratory Status: acceptable and nasal cannula  Postoperative Hydration stable      Kg Edouard MD  12/11/2024 3:06 PM

## 2024-12-11 NOTE — OPERATIVE REPORT
Operative Report     DATE OF PROCEDURE: 12/11/24    PRE-OP DIAGNOSIS: menorrhagia, fibroids    POST-OP DIAGNOSIS: SAME     PROCEDURE(S):    Total laparoscopic hysterectomy, bilateral salpingectomy, and cystoscopy.    ANESTHESIA: General     SURGEON(S): Katy Chatman DO     ASSISTANTS: Eva Grimaldo  The involvement of the surgical assistant was necessary in order to provide aid in exposure/retraction, hemostasis, closure, and other intraoperative technical functions in order to facilitate me as the primary surgeon carry out a safe operation with optimized results/outcome.       ESTIMATED BLOOD LOSS: 150 mL     IV FLUIDS: 1500 mL    URINARY OUTPUT: 600 mL     SPECIMENS: uterus, cervix, bilateral fallopian tubes    IMPLANTS: None      COMPLICATIONS: None     Findings: On bimanual exam, normal sized uterus, anteverted; multiparous appearing cervix. On laparoscopy, omental adhesions to anterior abdominal wall from the umbilicus inferior towards the bladder; enlarged, multifibroid, globular uterus, and normal bilateral tubles and ovaries.    On cystoscopy, no visible suture or other injury. Efflux of urine noted from bilateral ureteral orifices.    At end of surgery, excellent hemostasis noted at vaginal cuff and bilateral pedicles.    Procedure Details:   After informed consent was obtained, the patient was then taken back to the operating room. She was then placed in the supine position and general anesthesia was initiated with endotracheal intubation. The patient's arms were tucked by her side with protective soft padding placed around the joints. A padding sheet was then used to secure each arm with careful measures to decrease the risk the patient sustaining any compression injury. The patient was then placed in the dorsal lithotomy position . The abdomen and vagina were then prepped and draped in a normal sterile fashion.      A sterile speculum was placed in the patient's vagina and visualization of the cervix  was obtained with findings noted above. The anterior lip of the cervix was grasped with a single tooth tenaculum.  The uterus sounded to 8 cm. An 0-PDS suture was placed on the anterior lip of the cervix. A medium size V-care was then advanced through the cervical canal and secured. The PDS suture was then tied to the V-care cup. The V-care was noted to be secured and in the proper place. The single tooth tenaculum was removed. The urinary myers catheter was then inserted and secured in place to gravity.      Attention was then turned to the abdomen. After infiltration with 0.25% marcaine, a 5 mm horizontal incision was made with scalpel at Guillaume's point in the left upper quadrant. The abdomen was grasped and elevated. A Veress needle was then advanced through the incision until 2 pop sensations noted. A syringe with sterile water was then attached to the Veress needle, used to aspirate and then inject sterile water with confirmation of proper placement of the Veress needle. The Co2 gas tubing was then attached to the Veress needle and the opening pressure was noted to be 5 mmHg. The abdomen was insufflated with Co2 gas until 15 mmHg. A 5 mm optiview trocar was then advanced into the abdomen with the laparoscope under direct visualization. The abdomen was then insufflated with CO2 gas to a pressure of 15 mmHg and pneumoperitoneum was obtained.      A second and third 5 mm horizontal incisions were then made in the bilateral lower quadrants after infiltration with marcaine. The trocars were advanced under direct visualization. The patient was placed into trendelenburg position. A survey of the pelvis was performed with findings noted above.     The omental adhesions were lysed using the ligasure device.     The left fallopian tube was elevated and was transected along the mesosalpinx using ligasure device. It was transected at the cornua and removed through a trocar to be sent to pathology with the entire specimen.  The round ligament was then ligated. The utero-ovarian ligament was also ligated. The anterior leaf of the broad ligament was dissected and extended to created a bladder flap. The same steps were repeated on the right side. Ligation and dissection of the anterior and posterior leaves of the broad ligament were performed bilaterally. The bladder flap was created and the V care cervical cup was palpated.  The uterine arteries were identified and ligation of the uterine arteries were performed bilaterally with good hemostasis. The colpotomy was performed using electrocautery device. Completion of the colpotomy was noted.     The uterus with cervix were removed from the pelvis through the vagina. Due to the bulky nature of the uterus, it was bi-valved through the vagina and during this process, the cervix tore away from the remaining uterus and was sent to pathology in multiple pieces.     The vaginal cuff was closed with 0 suture in a vertical running fashion. Palpation revealed no defects. Good hemostasis noted. The urinary myers catheter was removed. A cystoscopy was performed without complications. No lesions noted along the bladder walls. Bilateral ureteral efflux was noted.     Attention was then returned to the abdomen. The surgical beds were noted to be hemostatic. The insufflation was released.  Each trocar was removed without complications. Each incision site was closed with 4-0 Monocryl subcutaneous running sutures. The abdomen was cleaned.  Skin glue was placed at each site.        DISPOSITION: awakened from anesthesia, extubated and taken to the recovery room in a stable condition.    Katy Chatman DO

## 2024-12-11 NOTE — H&P
GYN Pre-op History and Physical  [facility]    CHIEF COMPLAINT: Scheduled surgery   HISTORY OF PRESENT ILLNESS:   Briana Lala is a 49 year old female   who presents for scheduled total laparoscopic hysterectomy with bilateral salpingectomy and cysto for fibroid uterus and menorrhagia.    No complaints today.      PAST MEDICAL HISTORY:   Past Medical History:    Anemia    Asthma (HCC)    Fibroids    Screen for colon cancer    repeat CLN in 10 years    Transfusion history    Low blood due to heavy periods and Anemic        PAST SURGICAL HISTORY:   Past Surgical History:   Procedure Laterality Date      2003    Colonoscopy  10/23/2024    hemorrhoids    Colonoscopy N/A 10/23/2024    Procedure: COLONOSCOPY;  Surgeon: ASHOK Cid MD;  Location: [facility]    Egd  10/23/2024    gastric erosion    Tubal ligation          PAST OB HISTORY:  OB History    Para Term  AB Living   3 3 3     3   SAB IAB Ectopic Multiple Live Births                  # Outcome Date GA Lbr Abundio/2nd Weight Sex Type Anes PTL Lv   3 Term      Caesarean      2 Term      Caesarean      1 Term      Caesarean          CURRENT MEDICATIONS:      Current Outpatient Medications:     ferrous sulfate 325 (65 FE) MG Oral Tab EC, Take 1 tablet (325 mg total) by mouth daily with breakfast., Disp: , Rfl:     hydrocortisone 2.5 % External Cream, Apply 1 Application topically 2 (two) times daily., Disp: 30 g, Rfl: 1    albuterol (PROAIR HFA) 108 (90 Base) MCG/ACT Inhalation Aero Soln, Inhale 2 puffs into the lungs every 4 (four) hours as needed for Wheezing., Disp: 1 each, Rfl: 1    fluticasone propionate 110 MCG/ACT Inhalation Aerosol, Inhale 2 puffs into the lungs 2 (two) times daily., Disp: 1 each, Rfl: 3    Multiple Vitamins-Minerals (MULTIVITAL) Oral Tab, Take 1 tablet by mouth daily., Disp: , Rfl:     montelukast 10 MG Oral Tab, Take 1 tablet (10 mg total) by mouth nightly as needed., Disp: 90 tablet, Rfl:  1    ALLERGIES:  Allergies[1]    SOCIAL HISTORY:  Social History     Socioeconomic History    Marital status: Single   Tobacco Use    Smoking status: Never     Passive exposure: Never    Smokeless tobacco: Never   Vaping Use    Vaping status: Never Used   Substance and Sexual Activity    Alcohol use: No    Drug use: No    Sexual activity: Not Currently   Other Topics Concern    Caffeine Concern No    Exercise No    Seat Belt No    Special Diet No    Stress Concern No    Weight Concern No    Blood Transfusions Yes         FAMILY HISTORY:  Family History   Problem Relation Age of Onset    Hypertension Father     Hypertension Mother     Pancreatic Cancer Paternal Grandmother 77    Cancer Paternal Grandmother 77        Pancreatic cancer    Other (fibroids) Sister     Other (Other) Sister         fibroids    Breast Cancer Neg     Ovarian Cancer Neg      ASSESSMENTS:  PHYSICAL EXAM:   Patient's last menstrual period was 11/13/2024 (exact date).   Vitals:    12/03/24 1051   Weight: 130 lb (59 kg)   Height: 60\"     CONSTITUTIONAL: Awake, alert, cooperative, no apparent distress, and appears stated age   NECK: Supple, symmetrical, trachea midline, no adenopathy, thyroid symmetric, not enlarged and no tenderness  LUNGS: No excess work of breathing, LCTAB  CV: RRR no murmurs  ABDOMEN: Soft, non-distended, non-tender, no masses palpated    GENITAL/URINARY:    Uterus nontender.    MUSCULOSKELETAL: There is no redness, warmth, or swelling of the joints. Tone is normal.  NEUROLOGIC: Patient is awake, alert and oriented to name, place and time. Casual gait is normal.  SKIN: no bruising or bleeding and no rashes  PSYCHIATRIC: Behavior:  Appropriate  Mood:  appropriate    US (care-everywhere 8/29/24)  FINDINGS: Transvaginal imaging of the pelvis was performed.     The uterus measures 10.9 x 7.3 x 8.1 cm in size. The endometrial   stripe measures 6.7 mm in thickness.  Several hypoechoic lesions are   seen in the myometrium measuring  up to 3.5 cm in size. There appears   to be a small amount of fluid in the endometrium.     The right ovary measures 2.9 x 1.8 x 1.5 cm in size. No obvious   solid-appearing lesions are identified.  Blood flow is seen to the   ovary. A prominent follicle is seen.     The left ovary measures 3.3 x 2.0 x 1.6 cm in size. No obvious   solid-appearing lesions are identified.  Blood flow is seen to the   ovary. A 2 cm prominent follicle is seen.     No other obvious mass lesions are seen in the pelvis.     IMPRESSION:   1. Intramural and submucosal uterine fibroids are seen.   2. There is some fluid in the endometrium. Please correlate with the   patient's menstrual cycle.       EMB 10/31/24:  Final Diagnosis:    Endometrium; biopsy:   Interval phase endometrium.  Endometrial polyp.  No glandular atypia or malignancy identified.     ASSESSMENT AND PLAN:  49 year old  here for scheduled total laparoscopic hysterectomy with bilateral salpingectomy and cystoscopy for fibroid uterus.  - risks of surgery reviewed including pain, bleeding, infection, injury to surrounding structures (bowel, bladder, ureters, etc), need for laparotomy. All questions answered, consent form signed and in chart  - proceed to OR when ready.     Plan for DC home post-op     Katy Chatman DO         [1]   Allergies  Allergen Reactions    Shellfish ITCHING and TONGUE SWELLING

## 2024-12-11 NOTE — TELEPHONE ENCOUNTER
Left voicemail to call back.    Outreach patient to help her schedule a follow-up appointment for incision check in 2 weeks and in 6 weeks for routine post-op appointment.      12/23/2024 at 10:15 am open as of now.

## 2024-12-11 NOTE — ANESTHESIA PREPROCEDURE EVALUATION
Anesthesia PreOp Note    HPI:     Briana Lala is a 49 year old female who presents for preoperative consultation requested by: Katy Chatman DO    Date of Surgery: 2024    Procedure(s):  Total laparoscopic hysterectomy with cystoscopy, possible laparotomy  CYSTOSCOPY  Indication: Fibroids [D21.9]  Menorrhagia with regular cycle [N92.0]    Relevant Problems   No relevant active problems       NPO:  Last Liquid Consumption Date: 12/10/24  Last Liquid Consumption Time:   Last Solid Consumption Date: 12/10/24  Last Solid Consumption Time:   Last Liquid Consumption Date: 12/10/24          History Review:  Patient Active Problem List    Diagnosis Date Noted    Asthma (HCC) 2014       Past Medical History:    Anemia    Asthma (HCC)    Fibroids    Screen for colon cancer    repeat CLN in 10 years    Transfusion history    Low blood due to heavy periods and Anemic       Past Surgical History:   Procedure Laterality Date      2003    Colonoscopy  10/23/2024    hemorrhoids    Colonoscopy N/A 10/23/2024    Procedure: COLONOSCOPY;  Surgeon: ASHOK Cid MD;  Location: [facility]    Egd  10/23/2024    gastric erosion    Tubal ligation         Prescriptions Prior to Admission[1]  Current Medications and Prescriptions Ordered in Epic[2]    Allergies[3]    Family History   Problem Relation Age of Onset    Hypertension Father     Hypertension Mother     Pancreatic Cancer Paternal Grandmother 77    Cancer Paternal Grandmother 77        Pancreatic cancer    Other (fibroids) Sister     Other (Other) Sister         fibroids    Breast Cancer Neg     Ovarian Cancer Neg      Social History     Socioeconomic History    Marital status: Single   Tobacco Use    Smoking status: Never     Passive exposure: Never    Smokeless tobacco: Never   Vaping Use    Vaping status: Never Used   Substance and Sexual Activity    Alcohol use: No    Drug use: No    Sexual activity: Not Currently   Other Topics  Concern    Caffeine Concern No    Exercise No    Seat Belt No    Special Diet No    Stress Concern No    Weight Concern No    Blood Transfusions Yes       Available pre-op labs reviewed.  Lab Results   Component Value Date    WBC 4.1 12/11/2024    RBC 4.23 12/11/2024    HGB 11.1 (L) 12/11/2024    HCT 35.6 12/11/2024    MCV 84.2 12/11/2024    MCH 26.2 12/11/2024    MCHC 31.2 12/11/2024    RDW 15.8 (H) 12/11/2024    .0 12/11/2024    URINEPREG Negative 12/11/2024             Vital Signs:  Body mass index is 25.39 kg/m².   height is 1.524 m (5') and weight is 59 kg (130 lb).   Vitals:    12/03/24 1051   Weight: 59 kg (130 lb)   Height: 1.524 m (5')        Anesthesia Evaluation     Patient summary reviewed    Airway   Mallampati: II  TM distance: >3 FB  Neck ROM: full  Dental - Dentition appears grossly intact     Pulmonary     breath sounds clear to auscultation  (+) asthma  Cardiovascular - negative ROS  Exercise tolerance: good    ECG reviewed  Rhythm: regular  Rate: normal    Neuro/Psych - negative ROS     GI/Hepatic/Renal - negative ROS     Endo/Other      Comments: Obesity  Abdominal  - normal exam                 Anesthesia Plan:   ASA:  2  Plan:   General  Airway:  ETT  Post-op Pain Management: IV analgesics  Informed Consent Plan and Risks Discussed With:  Patient      I have informed Briana Lala and/or legal guardian or family member of the nature of the anesthetic plan, benefits, risks including possible dental damage if relevant, major complications, and any alternative forms of anesthetic management.   All of the patient's questions were answered to the best of my ability. The patient desires the anesthetic management as planned.  Kg Edouard MD  12/11/2024 11:24 AM  Present on Admission:  **None**           [1]   Medications Prior to Admission   Medication Sig Dispense Refill Last Dose/Taking    ferrous sulfate 325 (65 FE) MG Oral Tab EC Take 1 tablet (325 mg total) by mouth daily with  breakfast.   12/10/2024    hydrocortisone 2.5 % External Cream Apply 1 Application topically 2 (two) times daily. 30 g 1 12/11/2024 at  7:00 AM    Multiple Vitamins-Minerals (MULTIVITAL) Oral Tab Take 1 tablet by mouth daily.   12/10/2024    montelukast 10 MG Oral Tab Take 1 tablet (10 mg total) by mouth nightly as needed. 90 tablet 1 More than a month    albuterol (PROAIR HFA) 108 (90 Base) MCG/ACT Inhalation Aero Soln Inhale 2 puffs into the lungs every 4 (four) hours as needed for Wheezing. 1 each 1 More than a month    fluticasone propionate 110 MCG/ACT Inhalation Aerosol Inhale 2 puffs into the lungs 2 (two) times daily. 1 each 3 More than a month   [2]   Current Facility-Administered Medications Ordered in Epic   Medication Dose Route Frequency Provider Last Rate Last Admin    lactated ringers infusion   Intravenous Continuous Katy Chatman, DO 20 mL/hr at 12/11/24 1050 New Bag at 12/11/24 1050    ceFAZolin (Ancef) 2g in 10mL IV syringe premix  2 g Intravenous Once Katy Chatman, DO         No current [facility]-ordered outpatient medications on file.   [3]   Allergies  Allergen Reactions    Shellfish ITCHING and TONGUE SWELLING

## 2024-12-12 ENCOUNTER — TELEPHONE (OUTPATIENT)
Dept: OBGYN CLINIC | Facility: CLINIC | Age: 49
End: 2024-12-12

## 2024-12-12 NOTE — TELEPHONE ENCOUNTER
Received via fax a physician statement form for  to be fill out .    Sending form to the forms department .

## 2024-12-16 NOTE — TELEPHONE ENCOUNTER
Patient called regarding status of forms. Rec'd physicians statement via email. Sent [employer] message for Release of Information. Logged for processing. Patient requesting forms to be faxed and uploaded to [employer]. Details below.    Type of Leave: Continuous  Reason for Leave: Total Laparoscopic hysterectomy.  Start date of leave: 12/11/24 to 4-6 weeks pending re-eval 1/20/25  End date of leave:  How many flare ups per month/length?:  How many appts per month/length?:   Was Fee and Turnaround info Given?:     Patient requested policy #[id number] to be written on form.

## 2024-12-23 ENCOUNTER — OFFICE VISIT (OUTPATIENT)
Dept: OBGYN CLINIC | Facility: CLINIC | Age: 49
End: 2024-12-23
Payer: COMMERCIAL

## 2024-12-23 VITALS
WEIGHT: 133 LBS | BODY MASS INDEX: 25.11 KG/M2 | HEIGHT: 61 IN | DIASTOLIC BLOOD PRESSURE: 62 MMHG | SYSTOLIC BLOOD PRESSURE: 102 MMHG

## 2024-12-23 DIAGNOSIS — Z48.89 ENCOUNTER FOR POST SURGICAL WOUND CHECK: Primary | ICD-10-CM

## 2024-12-23 NOTE — PROGRESS NOTES
RETURN GYN OFFICE VISIT  [facility]    CHIEF COMPLAINT:    Chief Complaint   Patient presents with    Post-Op     2 wk f/I; hysterectomy        HISTORY OF PRESENT ILLNESS:    JOSE GUADALUPE is a 49 year old female  here for incision check s/p TLH with bs and cysto on 24.    Doing well.  Taking ibuprofen 1-2 times per day    Some spotting here and there - no other vaignal discharge  No probles with voiding or BM. Eating well    Vaginal is still a bit sore.  Doing well.  Taking ibuprofen 1-2 times per day    Some spotting here and there - no other vaignal discharge  No probles with voiding or BM. Eating well    Vaginal is still a bit sore.    REVIEW OF SYSTEMS:   CONSTITUTIONAL:  negative for fevers, chills, sweats and fatigue  GASTROINTESTINAL:  negative for nausea, vomiting, blood in stool, constipation, diarrhea and abdominal pain  GENITOURINARY: negative for no dysuria, urgency or frequency; no urinary incontinence; no hematuria  SKIN:  negative for  rash, skin lesion and pruritus  ENDOCRINE:  negative for acne, fatigue, weight gain and weight loss  BEHAVIOR/PSYCH:  negative for depressed mood, anhedonia and anxiety    CURRENT MEDICATIONS:      Current Outpatient Medications:     gabapentin 300 MG Oral Cap, Take 1 capsule (300 mg total) by mouth every 8 (eight) hours as needed (moderate surgical pain)., Disp: 30 capsule, Rfl: 0    ibuprofen 600 MG Oral Tab, Take 1 tablet (600 mg total) by mouth every 8 (eight) hours as needed for Pain., Disp: 60 tablet, Rfl: 1    montelukast 10 MG Oral Tab, Take 1 tablet (10 mg total) by mouth nightly as needed., Disp: 90 tablet, Rfl: 1    ferrous sulfate 325 (65 FE) MG Oral Tab EC, Take 1 tablet (325 mg total) by mouth daily with breakfast., Disp: , Rfl:     hydrocortisone 2.5 % External Cream, Apply 1 Application topically 2 (two) times daily., Disp: 30 g, Rfl: 1    albuterol (PROAIR HFA) 108 (90 Base) MCG/ACT Inhalation Aero Soln, Inhale 2 puffs into the lungs every  4 (four) hours as needed for Wheezing., Disp: 1 each, Rfl: 1    fluticasone propionate 110 MCG/ACT Inhalation Aerosol, Inhale 2 puffs into the lungs 2 (two) times daily., Disp: 1 each, Rfl: 3    Multiple Vitamins-Minerals (MULTIVITAL) Oral Tab, Take 1 tablet by mouth daily., Disp: , Rfl:     PAST MEDICAL, SOCIAL AND FAMILY HISTORY:    Past Medical History:    Anemia    Asthma (HCC)    Fibroids    Screen for colon cancer    repeat CLN in 10 years    Transfusion history    Low blood due to heavy periods and Anemic     Past Surgical History:   Procedure Laterality Date      2003    Colonoscopy  10/23/2024    hemorrhoids    Colonoscopy N/A 10/23/2024    Procedure: COLONOSCOPY;  Surgeon: ASHOK Cid MD;  Location: [facility]    Egd  10/23/2024    gastric erosion    Tubal ligation       Family History   Problem Relation Age of Onset    Hypertension Father     Hypertension Mother     Pancreatic Cancer Paternal Grandmother 77    Cancer Paternal Grandmother 77        Pancreatic cancer    Other (fibroids) Sister     Other (Other) Sister         fibroids    Breast Cancer Neg     Ovarian Cancer Neg      Social History     Socioeconomic History    Marital status: Single   Tobacco Use    Smoking status: Never     Passive exposure: Never    Smokeless tobacco: Never   Vaping Use    Vaping status: Never Used   Substance and Sexual Activity    Alcohol use: No    Drug use: No    Sexual activity: Not Currently   Other Topics Concern    Caffeine Concern No    Exercise No    Seat Belt No    Special Diet No    Stress Concern No    Weight Concern No    Blood Transfusions Yes           PHYSICAL EXAM:   Patient's last menstrual period was 2024 (exact date).; Body mass index is 25.13 kg/m².      CONSTITUTIONAL:  Awake, alert, cooperative, no apparent distress  EYES: sclera clear and conjunctiva normal  GASTROINTESTINAL:  soft, non-distended, non-tender, no masses palpated  Laparoscopic incision x 3 healing well,  nontender with no erythema.  SKIN:  No rashes  PSYCH:  Affect Normal    Pathology:     Final Diagnosis:      Uterus, cervix, and bilateral fallopian tubes; total laparoscopic hysterectomy and bilateral salpingectomy:   Uterine leiomyoma, multiple,  the largest is measuring 4.4 cm in greatest dimension.  Morcellated hysterectomy with secretory phase endometrium and multifocal myometrial adenomyosis.  No evidence of endometrial hyperplasia, atypia, or malignancy is identified.  Cervix with multiple nabothian cysts, and mild chronic cervicitis.  No evidence of dysplasia or malignancy is identified.  Bilateral fallopian tube with vascular congestion, and benign paratubal cysts.         ASSESSMENT AND PLAN:  1. Encounter for post surgical wound check  - doing well. Reviewed continued post-op precautions.  - reviewed benign pathology and intra-op photos    RTC 3-4 weeks for routine post-op appt      Katy Chatman DO

## 2024-12-30 NOTE — TELEPHONE ENCOUNTER
Patient called to check status of form - Informed patient form received and what the current processing time of 10-12 business days - due to holidays that would push the completion of form out a little. Patient verbalized understanding. Informed processing agent of information   PAST MEDICAL HISTORY:  Dyslipidemia     History of leukocytosis     HPV in female     Obesity      PAST MEDICAL HISTORY:  Advanced maternal age in multigravida     Decreased vision     Dyslipidemia     H/O varicose veins     High risk pregnancy with low PAPPA     History of leukocytosis     HPV in female     Obesity

## 2025-01-06 NOTE — TELEPHONE ENCOUNTER
Dr. Chatman,    Please sign off on form if you agree to: disability due to Total laparoscopic hysterectomy with cystoscopy.Start date 12/11/24-pending re-eval 01/20/25.    -Signature page will be the first page scanned  -From your Inbasket, Highlight the patient and click Chart   -Double click the 12/12/2024 Forms Completion telephone encounter  -Scroll down to the Media section   -Click the blue Hyperlink: disability Dr Chatman 01/06/25  -Click Acknowledge located in the top right ribbon/menu   -Drag the mouse into the blank space of the document and a + sign will appear. Left click to   electronically sign the document.  -Once signed, simply exit out of the screen and you signature will be saved.     Thank you,      Mayra

## 2025-01-20 ENCOUNTER — OFFICE VISIT (OUTPATIENT)
Dept: OBGYN CLINIC | Facility: CLINIC | Age: 50
End: 2025-01-20
Payer: COMMERCIAL

## 2025-01-20 VITALS
SYSTOLIC BLOOD PRESSURE: 132 MMHG | WEIGHT: 134 LBS | HEIGHT: 61 IN | BODY MASS INDEX: 25.3 KG/M2 | DIASTOLIC BLOOD PRESSURE: 80 MMHG

## 2025-01-20 DIAGNOSIS — Z09 POSTOPERATIVE EXAMINATION: Primary | ICD-10-CM

## 2025-01-20 PROBLEM — N92.4 EXCESSIVE BLEEDING IN PREMENOPAUSAL PERIOD: Status: RESOLVED | Noted: 2024-12-11 | Resolved: 2025-01-20

## 2025-01-20 PROBLEM — D21.9 FIBROIDS: Status: RESOLVED | Noted: 2024-12-11 | Resolved: 2025-01-20

## 2025-01-20 NOTE — PROGRESS NOTES
RETURN GYN OFFICE VISIT  [facility]    CHIEF COMPLAINT:    Chief Complaint   Patient presents with    Post-Op     6 wk f/u        HISTORY OF PRESENT ILLNESS:    JOSE GUADALUPE is a 49 year old female  here for post-op appt s/p TLH with BS and cysto on 24.  Feels good.  No pain.  No bleeding or discharge.  Eating well.  No problems with voiding or with BM      REVIEW OF SYSTEMS:   CONSTITUTIONAL:  negative for fevers, chills, sweats and fatigue  GASTROINTESTINAL:  negative for nausea, vomiting, blood in stool, constipation, diarrhea and abdominal pain  GENITOURINARY: negative for no dysuria, urgency or frequency; no urinary incontinence; no hematuria    SKIN:  negative for  rash, skin lesion and pruritus  ENDOCRINE:  negative for acne, fatigue, weight gain and weight loss  BEHAVIOR/PSYCH:  negative for depressed mood, anhedonia and anxiety    CURRENT MEDICATIONS:      Current Outpatient Medications:     montelukast 10 MG Oral Tab, Take 1 tablet (10 mg total) by mouth nightly as needed., Disp: 90 tablet, Rfl: 1    ferrous sulfate 325 (65 FE) MG Oral Tab EC, Take 1 tablet (325 mg total) by mouth daily with breakfast., Disp: , Rfl:     hydrocortisone 2.5 % External Cream, Apply 1 Application topically 2 (two) times daily., Disp: 30 g, Rfl: 1    albuterol (PROAIR HFA) 108 (90 Base) MCG/ACT Inhalation Aero Soln, Inhale 2 puffs into the lungs every 4 (four) hours as needed for Wheezing., Disp: 1 each, Rfl: 1    fluticasone propionate 110 MCG/ACT Inhalation Aerosol, Inhale 2 puffs into the lungs 2 (two) times daily., Disp: 1 each, Rfl: 3    Multiple Vitamins-Minerals (MULTIVITAL) Oral Tab, Take 1 tablet by mouth daily., Disp: , Rfl:     gabapentin 300 MG Oral Cap, Take 1 capsule (300 mg total) by mouth every 8 (eight) hours as needed (moderate surgical pain). (Patient not taking: Reported on 2025), Disp: 30 capsule, Rfl: 0    ibuprofen 600 MG Oral Tab, Take 1 tablet (600 mg total) by mouth every 8 (eight)  hours as needed for Pain. (Patient not taking: Reported on 2025), Disp: 60 tablet, Rfl: 1    PAST MEDICAL, SOCIAL AND FAMILY HISTORY:    Past Medical History:    Anemia    Asthma (HCC)    Fibroids    Screen for colon cancer    repeat CLN in 10 years    Transfusion history    Low blood due to heavy periods and Anemic     Past Surgical History:   Procedure Laterality Date      ,     Colonoscopy  10/23/2024    hemorrhoids    Colonoscopy N/A 10/23/2024    Procedure: COLONOSCOPY;  Surgeon: ASHOK Cid MD;  [facility]    Egd  10/23/2024    gastric erosion    Tubal ligation       Family History   Problem Relation Age of Onset    Hypertension Father     Hypertension Mother     Pancreatic Cancer Paternal Grandmother 77    Cancer Paternal Grandmother 77        Pancreatic cancer    Other (fibroids) Sister     Other (Other) Sister         fibroids    Breast Cancer Neg     Ovarian Cancer Neg      Social History     Socioeconomic History    Marital status: Single   Tobacco Use    Smoking status: Never     Passive exposure: Never    Smokeless tobacco: Never   Vaping Use    Vaping status: Never Used   Substance and Sexual Activity    Alcohol use: No    Drug use: No    Sexual activity: Not Currently   Other Topics Concern    Caffeine Concern No    Exercise No    Seat Belt No    Special Diet No    Stress Concern No    Weight Concern No    Blood Transfusions Yes           PHYSICAL EXAM:   Patient's last menstrual period was 2024 (exact date).; Body mass index is 25.32 kg/m².      CONSTITUTIONAL:  Awake, alert, cooperative, no apparent distress  EYES: sclera clear and conjunctiva normal  GASTROINTESTINAL:  soft, non-distended, non-tender, no masses palpated  GENITAL/URINARY:    External Genitalia:  General appearance; normal, Hair distribution; normal, Lesions absent   Urethral Meatus:  Lesions absent, Prolapse absent  Bladder:  Tenderness absent, Cystocele absent  Vagina:  Discharge  absent, Lesions absent, Pelvic support normal  Vaginal cuff well-healed, nontender and smooth. Suture knot came out during bimanual exam.  Adnexa:  Masses absent, Tenderness absent  Anus/Perineum:  Lesions absent  SKIN:  No rashes  PSYCH:  Affect Normal      ASSESSMENT AND PLAN:  1. Postoperative examination  - well-healed. Ok to return to normal activities.  - ok for RTW as previously planned on 2/3/25.  No need for pap smears anymore.    RTC 1 year for annual gyn exam.    Katy Chatman, DO

## (undated) DEVICE — SUT COAT VCRL+ 0 27IN UR-6 ABSRB VLT ANTIBACT

## (undated) DEVICE — VCARE MEDIUM, UTERINE MANIPULATOR, VAGINAL-CERVICAL-AHLUWALIA'S-RETRACTOR-ELEVATOR: Brand: VCARE

## (undated) DEVICE — RETRACTABLE L-HOOK LAPAROSCOPIC SEALER/DIVIDER: Brand: LIGASURE

## (undated) DEVICE — YANKAUER,FLEXIBLE HANDLE,REGLR CAPACITY: Brand: MEDLINE INDUSTRIES, INC.

## (undated) DEVICE — MEDI-VAC NON-CONDUCTIVE SUCTION TUBING: Brand: CARDINAL HEALTH

## (undated) DEVICE — HANDLE SUR BLU PLAS LT FLX SLIP ON ST DISP

## (undated) DEVICE — GLOVE SUR 7 SENSICARE PI MIC PIP CRM PWD F

## (undated) DEVICE — SUT MCRYL 4-0 18IN PS-2 ABSRB UD 19MM 3/8 CIR

## (undated) DEVICE — LAPAROSCOPY: Brand: MEDLINE INDUSTRIES, INC.

## (undated) DEVICE — SUT COAT VCRL 0 27IN CT-1 ABSRB VLT 36MM 1/2

## (undated) DEVICE — GLOVE SUR 7 SENSICARE NEOPR PWD F

## (undated) DEVICE — INSUFFLATION NEEDLE TO ESTABLISH PNEUMOPERITONEUM.: Brand: INSUFFLATION NEEDLE

## (undated) DEVICE — APPLICATOR STD 6IN COT TIP WOOD HNDL ST

## (undated) DEVICE — SYRINGE, LUER SLIP, STERILE, 60ML: Brand: MEDLINE

## (undated) DEVICE — SHEET,DRAPE,53X77,STERILE: Brand: MEDLINE

## (undated) DEVICE — V2 SPECIMEN COLLECTION MANIFOLD KIT: Brand: NEPTUNE

## (undated) DEVICE — KIT ENDO ORCAPOD 160/180/190

## (undated) DEVICE — COVER,MAYO STAND,STERILE: Brand: MEDLINE

## (undated) DEVICE — CO2 CANNULA,SSOFT,ADLT,7O2,4CO2,FEMALE: Brand: MEDLINE

## (undated) DEVICE — Device: Brand: SUTURE PASSOR PRO

## (undated) DEVICE — CONMED SCOPE SAVER BITE BLOCK, 20X27 MM: Brand: SCOPE SAVER

## (undated) DEVICE — ADHESIVE SKIN TOP FOR WND CLSR DERMBND ADV

## (undated) DEVICE — YANKAUER,BULB TIP,W/O VENT,RIGID,STERILE: Brand: MEDLINE

## (undated) DEVICE — TUR Y CYSTO TUBING SET IRRIG L96IN

## (undated) DEVICE — STERILE H2O FOR IRRIG .

## (undated) DEVICE — TROCAR: Brand: KII FIOS FIRST ENTRY

## (undated) DEVICE — DRAPE, LAPAROSCOPY PELVISCOPY: Brand: MEDLINE

## (undated) DEVICE — AIRSEAL 5 MM ACCESS PORT AND LOW PROFILE OBTURATOR WITH BLADELESS OPTICAL TIP, 120 MM LENGTH: Brand: AIRSEAL

## (undated) DEVICE — 3M™ STERI-DRAPE™ INSTRUMENT POUCH 1018L: Brand: STERI-DRAPE™

## (undated) DEVICE — SOLUTION IRRIG 1000ML 0.9% NACL USP BTL

## (undated) DEVICE — AIRSEAL TRI-LUMEN LILTERED TUBE SET: Brand: AIRSEAL

## (undated) DEVICE — GLOVE SUR 6.5 SENSICARE PI MIC PIP CRM PWD F

## (undated) DEVICE — JELLY,LUBE,STERILE,FLIP TOP,TUBE,2-OZ: Brand: MEDLINE

## (undated) DEVICE — NDLCTR: FOAM/ADHESIVE 10CT 96/CS: Brand: MEDICAL ACTION INDUSTRIES

## (undated) DEVICE — GIJAW SINGLE-USE BIOPSY FORCEPS WITH NEEDLE: Brand: GIJAW

## (undated) DEVICE — MEDI-VAC NON-CONDUCTIVE SUCTION TUBING 6MM X 1.8M (6FT.) L: Brand: CARDINAL HEALTH

## (undated) DEVICE — KIT CLEAN ENDOKIT 1.1OZ GOWNX2

## (undated) DEVICE — STERILE POLYISOPRENE POWDER-FREE SURGICAL GLOVES WITH EMOLLIENT COATING: Brand: PROTEXIS

## (undated) DEVICE — SUT PDS II 0 36IN CT-1 ABSRB VLT L36MM 1/2

## (undated) DEVICE — TOWEL,OR,DSP,ST,BLUE,DLX,2/PK,40PK/CS: Brand: MEDLINE

## (undated) DEVICE — DRAPE,UNDRBUT,WHT GRAD PCH,CAPPORT,20/CS: Brand: MEDLINE

## (undated) DEVICE — VIOLET BRAIDED (POLYGLACTIN 910), SYNTHETIC ABSORBABLE SUTURE: Brand: COATED VICRYL

## (undated) DEVICE — DISPOSABLE SUCTION/IRRIGATOR TUBE SET: Brand: AHTO

## (undated) DEVICE — PAD POS 36IN DISP SURGYPAD

## (undated) DEVICE — TROCAR: Brand: KII® SLEEVE

## (undated) NOTE — LETTER
25    RE: Briana Lala    : 1975    To Whom It May Concern:    Our patient, Briana Lala,      __x___Has received care in our office on 25 (post-operative exam s/p surgery on 24.___________________________.        _____Has been hospitalized on the following dates ______________________.       _____ Has been ill and unable to work from _____________________________.        __x___ May resume work / school on __2/3/25 with no restrictions___________.      _____ May resume work / school with the following restrictions _____________.      _____ May participate in physical education.      _____ May participate in a prenatal exercise class / yoga class.      _____ Patient is pregnant with a due date of Estimated Date of Delivery: None noted.      _____ Other _____________________________________________________     __________________________________________________________       Sincerely Katy farris, DO

## (undated) NOTE — LETTER
Briana Lala, :1975    CONSENT FOR PROCEDURE/SEDATION    1. I authorize the performance upon Briana Lala  the following: Endometrial biopsy procedure    2. I authorize Dr. Katy Chatman,  (and whomever is designated as the doctor’s assistant), to perform the above-mentioned procedures.    3. If any unforeseen conditions arise during this procedure calling for additional  procedures, operations, or medications (including anesthesia and blood transfusion), I further request and authorize the doctor to do whatever he/she deems advisable in my interest.    4. I consent to the taking and reproduction of any photographs in the course of this procedure for professional purposes.    5. I consent to the administration of such sedation as may be considered necessary or advisable by the physician responsible for this service, with the exception of ______________________________________________________    6. I have been informed by my doctor of the nature and purpose of this procedure sedation, possible alternative methods of treatment, risk involved and possible complications.        Signature of Patient:_______________________________________________    Signature of person authorized to consent for patient:  _______________________________________________________________    Relationship to patient: ____________________________________________    Witness: _________________________________________ Date:___________     Physician Signature: _______________________________ Date:___________

## (undated) NOTE — LETTER
10/31/24    RE: Briana DIAS Spike    : 1975    To Whom It May Concern:    Our patient, Briana Lala,      __x___Has received treatment in our office on  10/31/24.        _____Has been hospitalized on the following dates ______________________.       _____ Has been ill and unable to work from _____________________________.        ___x__ May resume work / school on   10/31/24.      _____ May resume work / school with the following restrictions ______________    __________________________________________________________.      _____ May participate in physical education.      _____ May participate in a prenatal exercise class / yoga class.             Sincerely yours,       Katy Chatman, DO

## (undated) NOTE — LETTER
5/18/2022          To Whom It May Concern:    Charleen Us is currently under my medical care and may not return to work at this time. Please excuse Chastity Sanchez for 1 days. She may return to work on 5/19/2022. Activity is restricted as follows: none. If you require additional information please contact our office.         Sincerely,          Cullen Cantrell DO          Document generated by:  Cullen Cantrell DO

## (undated) NOTE — LETTER
10/17/2024          To Whom It May Concern:    Briana Lala was seen in office today.    If you require additional information please contact our office.        Sincerely,    MICHAEL Pena          Document generated by:  MICHAEL Pena

## (undated) NOTE — LETTER
ASTHMA ACTION PLAN for Tracie Beltran     : 1975     Date: 23  Doctor:  Rachel Ibrahim DO  [facility]: [facility], [address] [address] [phone]      ACT Score: 25    ACT Goal: 20 or greater    Call your provider if you require your rescue/quick reliever medication more than 2-3 times in a 24 hour period. If you require your rescue inhaler/medication more than 2-3 times weekly, your asthma may not be under proper control and you should seek medical attention. *Quick Relievers are Xopenex and Albuterol*    You can use the colors of a traffic light to help learn about your asthma medicines. Year Round       1. Green - Go! % of Personal Best Peak Flow   Use controller medicine. Breathing is good  No cough or wheeze  Can work and play Medicine How much to take When to take it    Medications       Steroid Inhalants Instructions     fluticasone propionate 110 MCG/ACT Inhalation Aerosol    Inhale 2 puffs into the lungs 2 (two) times daily. Leukotriene Modulators Instructions     montelukast 10 MG Oral Tab    Take 1 tablet (10 mg total) by mouth nightly. Sympathomimetics Instructions     albuterol (PROAIR HFA) 108 (90 Base) MCG/ACT Inhalation Aero Soln    Inhale 2 puffs into the lungs every 4 (four) hours as needed for Wheezing. 2. Yellow - Caution. 50-79% Personal Best Peak Flow  Use reliever medicine to keep an asthma attack from getting bad. Cough  Quick Relievers  Wheezing  Tight Chest  Wake up at night Medicine How much to take When to take it    If symptoms are not improving in 24-48 hrs, call office for further instructions  Medications       Steroid Inhalants Instructions     fluticasone propionate 110 MCG/ACT Inhalation Aerosol    Inhale 2 puffs into the lungs 2 (two) times daily.       Leukotriene Modulators Instructions     montelukast 10 MG Oral Tab    Take 1 tablet (10 mg total) by mouth nightly. Sympathomimetics Instructions     albuterol (PROAIR HFA) 108 (90 Base) MCG/ACT Inhalation Aero Soln    Inhale 2 puffs into the lungs every 4 (four) hours as needed for Wheezing. 3. Red - Stop! Danger! <50% Personal Best Peak Flow  Continue Controller Medications But ADD:   Medicine not helping  Breathing is hard and fast  Nose opens wide  Can't walk  Ribs show  Can't talk well Medicine How much to take When to take it    If your symptoms do not improve in ONE hour -  go to the emergency room or call 911 immediately! If symptoms improve, call office for appointment immediately. Albuterol inhaler 2 puffs every 20 minutes for three treatments       Don't forget:  Rinse mouth after using inhaler  Use spacer for inhaler  Remember to get your Flu vaccine every fall! [x] Asthma Action Plan reviewed with the caregiver and patient, and a copy of the plan was given to the patient/caregiver. [] Asthma Action Plan reviewed with the caregiver and patient on the phone, and copy mailed to patient/caregiver or sent via [employer].      Signatures:   Provider  Gerson Ocasio, DO Patient  Zuleika Negron

## (undated) NOTE — LETTER
[facility]  [address], [place], [place]    Authorization for Surgical Operation and Procedure                               I hereby authorize ASHOK Cid MD, my physician and his/her assistants (if applicable), which may include medical students, residents, and/or fellows, to perform the following surgical operation/ procedure and administer such anesthesia as may be determined necessary by my physician: Operation/Procedure name (s) COLONOSCOPY / ESOPHAGOGASTRODUODENOSCOPY on Brianadeann Lala   2.   I recognize that during the surgical operation/procedure, unforeseen conditions may necessitate additional or different procedures than those listed above.  I, therefore, further authorize and request that the above-named surgeon, assistants, or designees perform such procedures as are, in their judgment, necessary and desirable.    3.   My surgeon/physician has discussed prior to my surgery the potential benefits, risks and side effects of this procedure; the likelihood of achieving goals; and potential problems that might occur during recuperation.  They also discussed reasonable alternatives to the procedure, including risks, benefits, and side effects related to the alternatives and risks related to not receiving this procedure.  I have had all my questions answered and I acknowledge that no guarantee has been made as to the result that may be obtained.    4.   Should the need arise during my operation/procedure, which includes change of level of care prior to discharge, I also consent to the administration of blood and/or blood products.  Further, I understand that despite careful testing and screening of blood or blood products by collecting agencies, I may still be subject to ill effects as a result of receiving a blood transfusion and/or blood products.  The following are some, but not all, of the potential risks that can occur: fever and allergic reactions, hemolytic reactions,  transmission of diseases such as Hepatitis, AIDS and Cytomegalovirus (CMV) and fluid overload.  In the event that I wish to have an autologous transfusion of my own blood, or a directed donor transfusion, I will discuss this with my physician.  Check only if Refusing Blood or Blood Products  I understand refusal of blood or blood products as deemed necessary by my physician may have serious consequences to my condition to include possible death. I hereby assume responsibility for my refusal and release the hospital, its personnel, and my physicians from any responsibility for the consequences of my refusal.    o  Refuse   5.   I authorize the use of any specimen, organs, tissues, body parts or foreign objects that may be removed from my body during the operation/procedure for diagnosis, research or teaching purposes and their subsequent disposal by hospital authorities.  I also authorize the release of specimen test results and/or written reports to my treating physician on the hospital medical staff or other referring or consulting physicians involved in my care, at the discretion of the Pathologist or my treating physician.    6.   I consent to the photographing or videotaping of the operations or procedures to be performed, including appropriate portions of my body for medical, scientific, or educational purposes, provided my identity is not revealed by the pictures or by descriptive texts accompanying them.  If the procedure has been photographed/videotaped, the surgeon will obtain the original picture, image, videotape or CD.  The hospital will not be responsible for storage, release or maintenance of the picture, image, tape or CD.    7.   I consent to the presence of a  or observers in the operating room as deemed necessary by my physician or their designees.    8.   I recognize that in the event my procedure results in extended X-Ray/fluoroscopy time, I may develop a skin reaction.    9. If  I have a Do Not Attempt Resuscitation (DNAR) order in place, that status will be suspended while in the operating room, procedural suite, and during the recovery period unless otherwise explicitly stated by me (or a person authorized to consent on my behalf). The surgeon or my attending physician will determine when the applicable recovery period ends for purposes of reinstating the DNAR order.  10. Patients having a sterilization procedure: I understand that if the procedure is successful the results will be permanent and it will therefore be impossible for me to inseminate, conceive, or bear children.  I also understand that the procedure is intended to result in sterility, although the result has not been guaranteed.   11. I acknowledge that my physician has explained sedation/analgesia administration to me including the risk and benefits I consent to the administration of sedation/analgesia as may be necessary or desirable in the judgment of my physician.    I CERTIFY THAT I HAVE READ AND FULLY UNDERSTAND THE ABOVE CONSENT TO OPERATION and/or OTHER PROCEDURE.     ____________________________________  _________________________________        ______________________________  Signature of Patient    Signature of Responsible Person                Printed Name of Responsible Person                                      ____________________________________  _____________________________                ________________________________  Signature of Witness        Date  Time         Relationship to Patient    STATEMENT OF PHYSICIAN My signature below affirms that prior to the time of the procedure; I have explained to the patient and/or his/her legal representative, the risks and benefits involved in the proposed treatment and any reasonable alternative to the proposed treatment. I have also explained the risks and benefits involved in refusal of the proposed treatment and alternatives to the proposed treatment and have  answered the patient's questions. If I have a significant financial interest in a co-management agreement or a significant financial interest in any product or implant, or other significant relationship used in this procedure/surgery, I have disclosed this and had a discussion with my patient.     _____________________________________________________              _____________________________  (Signature of Physician)                                                                                         (Date)                                   (Time)  Patient Name: Briana DIAS Lala      : 1975      Printed: 10/22/2024     Medical Record #: [id number]                                      Page 1 of 1

## (undated) NOTE — LETTER
ASTHMA ACTION PLAN for Leobardotta Flight     : 1975     Date: 10/16/19  Doctor:  Lobo Medina DO  Phone for doctor or clinic: [facility] , [address], [place] [facility]  [phone]      ACT Score: Inhale 2 puffs into the lungs every 4 (four) hours as needed for Wheezing. ALBUTEROL SULFATE HFA IN    Inhale  into the lungs. 3. Red - Stop!  Danger! <50% Personal Best Peak Flow  Continue Controller Medications But ADD:   Medicine no

## (undated) NOTE — LETTER
2/8/2021          To Whom It May Concern:    Yovany Cook is currently under my medical care and may not return to work at this time. Please excuse Sussye Gravbrooks for 1 weeks. She may return to work on Monday February 15th, 2021.   Activity is restricted

## (undated) NOTE — LETTER
[facility]  Administration of Anesthesia  Briana ZAMUDIO agree to be cared for by a physician anesthesiologist alone and/or with a nurse anesthetist, who is specially trained to monitor me and give me medicine to put me to sleep or keep me comfortable during my procedure    I understand that my anesthesiologist and/or anesthetist is not an employee or agent of [facility] or [employer]. He or she works for [facility]    As the patient asking for anesthesia services, I agree to:  Allow the anesthesiologist (anesthesia doctor) to give me medicine and do additional procedures as necessary. Some examples are: Starting or using an “IV” to give me medicine, fluids or blood during my procedure, and having a breathing tube placed to help me breathe when I’m asleep (intubation). In the event that my heart stops working properly, I understand that my anesthesiologist will make every effort to sustain my life, unless otherwise directed by [facility] Do Not Resuscitate documents.  Tell my anesthesia doctor before my procedure:  If I am pregnant.  The last time that I ate or drank.  iii. All of the medicines I take (including prescriptions, herbal supplements, and pills I can buy without a prescription (including street drugs/illegal medications). Failure to inform my anesthesiologist about these medicines may increase my risk of anesthetic complications.  iv.If I am allergic to anything or have had a reaction to anesthesia before.  I understand how the anesthesia medicine will help me (benefits).  I understand that with any type of anesthesia medicine there are risks:  The most common risks are: nausea, vomiting, sore throat, muscle soreness, damage to my eyes, mouth, or teeth (from breathing tube placement).  Rare risks include: remembering what happened during my procedure, allergic reactions to medications, injury to my airway, heart, lungs, vision, nerves, or  muscles and in extremely rare instances death.  My doctor has explained to me other choices available to me for my care (alternatives).  Pregnant Patients (“epidural”):  I understand that the risks of having an epidural (medicine given into my back to help control pain during labor), include itching, low blood pressure, difficulty urinating, headache or slowing of the baby’s heart. Very rare risks include infection, bleeding, seizure, irregular heart rhythms and nerve injury.  Regional Anesthesia (“spinal”, “epidural”, & “nerve blocks”):  I understand that rare but potential complications include headache, bleeding, infection, seizure, irregular heart rhythms, and nerve injury.    _____________________________________________________________________________  Patient (or Representative) Signature/Relationship to Patient  Date   Time    _____________________________________________________________________________   Name (if used)    Language/Organization   Time    _____________________________________________________________________________  Nurse Anesthetist Signature     Date   Time  _____________________________________________________________________________  Anesthesiologist Signature     Date   Time  I have discussed the procedure and information above with the patient (or patient’s representative) and answered their questions. The patient or their representative has agreed to have anesthesia services.    _____________________________________________________________________________  Witness        Date   Time  I have verified that the signature is that of the patient or patient’s representative, and that it was signed before the procedure  Patient Name: Briana Lala     : 1975                 Printed: 10/22/2024 at 6:43 AM    Medical Record #: [id number]                                            Page 1 of 1  ----------ANESTHESIA CONSENT----------

## (undated) NOTE — LETTER
4/1/2019          To Whom It May Concern:    Wes Lilly is currently under my medical care and may not return to work at this time. Please excuse Zenaida Woo for 1 weeks. She may return to work on 4/8/19. Activity is restricted as follows: none.

## (undated) NOTE — LETTER
ASTHMA ACTION PLAN for Briana Lala     : 1975     Date: 24  Doctor:  Colleen Weiler, DO  Phone for doctor or clinic: [facility], [address] [address] [phone]      ACT Score: 24    ACT Goal: 20 or greater    Call your provider if you require your rescue/quick reliever medication more than 2-3 times in a 24 hour period.    If you require your rescue inhaler/medication more than 2-3 times weekly, your asthma may not be under proper control and you should seek medical attention.    *Quick Relievers are Xopenex and Albuterol*    You can use the colors of a traffic light to help learn about your asthma medicines.  Year Round       1. Green - Go! % of Personal Best Peak Flow   Use controller medicine.   Breathing is good  No cough or wheeze  Can work and play Medicine How much to take When to take it    Medications       Steroid Inhalants Instructions     fluticasone propionate 110 MCG/ACT Inhalation Aerosol Inhale 2 puffs into the lungs 2 (two) times daily.       Leukotriene Modulators Instructions     montelukast 10 MG Oral Tab Take 1 tablet (10 mg total) by mouth nightly.       Sympathomimetics Instructions     albuterol (PROAIR HFA) 108 (90 Base) MCG/ACT Inhalation Aero Soln Inhale 2 puffs into the lungs every 4 (four) hours as needed for Wheezing.                    2. Yellow - Caution. 50-79% Personal Best Peak Flow  Use reliever medicine to keep an asthma attack from getting bad.   Cough  Quick Relievers  Wheezing  Tight Chest  Wake up at night Medicine How much to take When to take it    If symptoms are not improving in 24-48 hrs, call office for further instructions  Medications       Steroid Inhalants Instructions     fluticasone propionate 110 MCG/ACT Inhalation Aerosol Inhale 2 puffs into the lungs 2 (two) times daily.       Leukotriene Modulators Instructions     montelukast 10 MG Oral Tab Take 1 tablet (10 mg  total) by mouth nightly.       Sympathomimetics Instructions     albuterol (PROAIR HFA) 108 (90 Base) MCG/ACT Inhalation Aero Soln Inhale 2 puffs into the lungs every 4 (four) hours as needed for Wheezing.                    3. Red - Stop! Danger! <50% Personal Best Peak Flow  Continue Controller Medications But ADD:   Medicine not helping  Breathing is hard and fast  Nose opens wide  Can't walk  Ribs show  Can't talk well Medicine How much to take When to take it    If your symptoms do not improve in ONE hour -  go to the emergency room or call 911 immediately! If symptoms improve, call office for appointment immediately.    Albuterol inhaler 2 puffs every 20 minutes for three treatments       Don't forget:  Rinse mouth after using inhaler  Use spacer for inhaler  Remember to get your Flu vaccine every fall!    [x] Asthma Action Plan reviewed with the caregiver and patient, and a copy of the plan was given to the patient/caregiver.   [] Asthma Action Plan reviewed with the caregiver and patient on the phone, and copy mailed to patient/caregiver or sent via [address].     Signatures:   Provider  Colleen Weiler, DO Patient  Briana Lala Caretaker